# Patient Record
Sex: MALE | Race: OTHER | Employment: FULL TIME | URBAN - METROPOLITAN AREA
[De-identification: names, ages, dates, MRNs, and addresses within clinical notes are randomized per-mention and may not be internally consistent; named-entity substitution may affect disease eponyms.]

---

## 2018-06-10 ENCOUNTER — HOSPITAL ENCOUNTER (EMERGENCY)
Age: 38
Discharge: HOME | End: 2018-06-10
Attending: EMERGENCY MEDICINE

## 2018-06-10 VITALS
SYSTOLIC BLOOD PRESSURE: 141 MMHG | RESPIRATION RATE: 18 BRPM | DIASTOLIC BLOOD PRESSURE: 91 MMHG | OXYGEN SATURATION: 97 % | TEMPERATURE: 98.7 F | HEIGHT: 74 IN | BODY MASS INDEX: 35.29 KG/M2 | HEART RATE: 105 BPM | WEIGHT: 275 LBS

## 2018-06-10 DIAGNOSIS — S61.411A LACERATION OF RIGHT HAND WITHOUT FOREIGN BODY, INITIAL ENCOUNTER: Primary | ICD-10-CM

## 2018-06-10 PROCEDURE — 12002 RPR S/N/AX/GEN/TRNK2.6-7.5CM: CPT

## 2018-06-10 PROCEDURE — 2500000003 HC RX 250 WO HCPCS: Performed by: NURSE PRACTITIONER

## 2018-06-10 PROCEDURE — 6370000000 HC RX 637 (ALT 250 FOR IP): Performed by: EMERGENCY MEDICINE

## 2018-06-10 PROCEDURE — 99282 EMERGENCY DEPT VISIT SF MDM: CPT

## 2018-06-10 PROCEDURE — 2580000003 HC RX 258: Performed by: EMERGENCY MEDICINE

## 2018-06-10 RX ORDER — CEPHALEXIN 500 MG/1
500 CAPSULE ORAL 4 TIMES DAILY
Qty: 28 CAPSULE | Refills: 0 | Status: SHIPPED | OUTPATIENT
Start: 2018-06-10 | End: 2018-06-17

## 2018-06-10 RX ORDER — LIDOCAINE HYDROCHLORIDE 20 MG/ML
5 INJECTION, SOLUTION INFILTRATION; PERINEURAL ONCE
Status: COMPLETED | OUTPATIENT
Start: 2018-06-10 | End: 2018-06-10

## 2018-06-10 RX ORDER — MAGNESIUM HYDROXIDE 1200 MG/15ML
500 LIQUID ORAL ONCE
Status: COMPLETED | OUTPATIENT
Start: 2018-06-10 | End: 2018-06-10

## 2018-06-10 RX ORDER — DIAPER,BRIEF,INFANT-TODD,DISP
EACH MISCELLANEOUS ONCE
Status: COMPLETED | OUTPATIENT
Start: 2018-06-10 | End: 2018-06-10

## 2018-06-10 RX ORDER — CEPHALEXIN 500 MG/1
500 CAPSULE ORAL ONCE
Status: COMPLETED | OUTPATIENT
Start: 2018-06-10 | End: 2018-06-10

## 2018-06-10 RX ADMIN — LIDOCAINE HYDROCHLORIDE 5 ML: 20 INJECTION, SOLUTION INFILTRATION; PERINEURAL at 02:56

## 2018-06-10 RX ADMIN — BACITRACIN ZINC 1 G: 500 OINTMENT TOPICAL at 02:35

## 2018-06-10 RX ADMIN — WATER 500 ML: 100 IRRIGANT IRRIGATION at 02:28

## 2018-06-10 RX ADMIN — CEPHALEXIN 500 MG: 500 CAPSULE ORAL at 02:27

## 2018-06-10 ASSESSMENT — PAIN SCALES - GENERAL: PAINLEVEL_OUTOF10: 0

## 2019-11-14 ENCOUNTER — HOSPITAL ENCOUNTER (EMERGENCY)
Age: 39
Discharge: HOME | End: 2019-11-14
Attending: EMERGENCY MEDICINE
Payer: COMMERCIAL

## 2019-11-14 ENCOUNTER — APPOINTMENT (OUTPATIENT)
Dept: GENERAL RADIOLOGY | Age: 39
End: 2019-11-14
Payer: COMMERCIAL

## 2019-11-14 VITALS
DIASTOLIC BLOOD PRESSURE: 85 MMHG | RESPIRATION RATE: 16 BRPM | SYSTOLIC BLOOD PRESSURE: 132 MMHG | HEIGHT: 74 IN | OXYGEN SATURATION: 97 % | BODY MASS INDEX: 34.01 KG/M2 | WEIGHT: 265 LBS | TEMPERATURE: 99 F

## 2019-11-14 DIAGNOSIS — M72.2 PLANTAR FASCIITIS OF LEFT FOOT: Primary | ICD-10-CM

## 2019-11-14 PROCEDURE — 6370000000 HC RX 637 (ALT 250 FOR IP): Performed by: EMERGENCY MEDICINE

## 2019-11-14 PROCEDURE — 73630 X-RAY EXAM OF FOOT: CPT

## 2019-11-14 PROCEDURE — 99283 EMERGENCY DEPT VISIT LOW MDM: CPT

## 2019-11-14 RX ORDER — TRAMADOL HYDROCHLORIDE 50 MG/1
50 TABLET ORAL EVERY 6 HOURS PRN
Qty: 12 TABLET | Refills: 0 | Status: SHIPPED | OUTPATIENT
Start: 2019-11-14 | End: 2019-11-17

## 2019-11-14 RX ORDER — IBUPROFEN 800 MG/1
800 TABLET ORAL ONCE
Status: COMPLETED | OUTPATIENT
Start: 2019-11-14 | End: 2019-11-14

## 2019-11-14 RX ORDER — NAPROXEN 500 MG/1
500 TABLET ORAL 2 TIMES DAILY WITH MEALS
Qty: 20 TABLET | Refills: 0 | Status: ON HOLD | OUTPATIENT
Start: 2019-11-14 | End: 2020-07-30

## 2019-11-14 RX ADMIN — IBUPROFEN 800 MG: 800 TABLET, FILM COATED ORAL at 02:12

## 2019-11-14 ASSESSMENT — PAIN SCALES - GENERAL
PAINLEVEL_OUTOF10: 8
PAINLEVEL_OUTOF10: 8

## 2019-11-14 ASSESSMENT — ENCOUNTER SYMPTOMS
COUGH: 0
VOMITING: 0
SORE THROAT: 0
EYE DISCHARGE: 0
WHEEZING: 0
ABDOMINAL DISTENTION: 0
BACK PAIN: 0
PHOTOPHOBIA: 0
CHEST TIGHTNESS: 0
SHORTNESS OF BREATH: 0
ABDOMINAL PAIN: 0

## 2019-11-14 ASSESSMENT — PAIN DESCRIPTION - LOCATION: LOCATION: FOOT

## 2019-11-14 ASSESSMENT — PAIN DESCRIPTION - PAIN TYPE: TYPE: ACUTE PAIN

## 2019-11-14 ASSESSMENT — PAIN DESCRIPTION - ORIENTATION: ORIENTATION: LEFT

## 2020-07-30 ENCOUNTER — APPOINTMENT (OUTPATIENT)
Dept: CT IMAGING | Age: 40
DRG: 871 | End: 2020-07-30
Payer: COMMERCIAL

## 2020-07-30 ENCOUNTER — HOSPITAL ENCOUNTER (INPATIENT)
Age: 40
LOS: 5 days | Discharge: HOME | DRG: 871 | End: 2020-08-04
Attending: EMERGENCY MEDICINE | Admitting: INTERNAL MEDICINE
Payer: COMMERCIAL

## 2020-07-30 PROBLEM — K85.90 ACUTE PANCREATITIS: Status: ACTIVE | Noted: 2020-07-30

## 2020-07-30 LAB
ALBUMIN SERPL-MCNC: 3.5 G/DL (ref 3.5–4.6)
ALP BLD-CCNC: 104 U/L (ref 35–104)
ALT SERPL-CCNC: <5 U/L (ref 0–41)
ANION GAP SERPL CALCULATED.3IONS-SCNC: 8 MEQ/L (ref 9–15)
ANISOCYTOSIS: ABNORMAL
AST SERPL-CCNC: <5 U/L (ref 0–40)
BASOPHILS ABSOLUTE: 0 K/UL (ref 0–0.2)
BASOPHILS RELATIVE PERCENT: 0.2 %
BILIRUB SERPL-MCNC: 0.3 MG/DL (ref 0.2–0.7)
BUN BLDV-MCNC: 8 MG/DL (ref 6–20)
CALCIUM SERPL-MCNC: 8.7 MG/DL (ref 8.5–9.9)
CHLORIDE BLD-SCNC: 87 MEQ/L (ref 95–107)
CHP ED QC CHECK: YES
CO2: 28 MEQ/L (ref 20–31)
CREAT SERPL-MCNC: 0.46 MG/DL (ref 0.7–1.2)
EKG ATRIAL RATE: 103 BPM
EKG P AXIS: 50 DEGREES
EKG P-R INTERVAL: 132 MS
EKG Q-T INTERVAL: 326 MS
EKG QRS DURATION: 96 MS
EKG QTC CALCULATION (BAZETT): 427 MS
EKG R AXIS: 22 DEGREES
EKG T AXIS: 17 DEGREES
EKG VENTRICULAR RATE: 103 BPM
EOSINOPHILS ABSOLUTE: 0.2 K/UL (ref 0–0.7)
EOSINOPHILS RELATIVE PERCENT: 1.3 %
GFR AFRICAN AMERICAN: >60
GFR NON-AFRICAN AMERICAN: >60
GLOBULIN: 1.9 G/DL (ref 2.3–3.5)
GLUCOSE BLD-MCNC: 283 MG/DL (ref 70–99)
GLUCOSE BLD-MCNC: 301 MG/DL (ref 60–115)
GLUCOSE BLD-MCNC: 308 MG/DL (ref 60–115)
GLUCOSE BLD-MCNC: 353 MG/DL
GLUCOSE BLD-MCNC: 353 MG/DL (ref 60–115)
HCT VFR BLD CALC: 36.9 % (ref 42–52)
HEMOGLOBIN: 12.5 G/DL (ref 14–18)
LIPASE: 123 U/L (ref 12–95)
LYMPHOCYTES ABSOLUTE: 1.1 K/UL (ref 1–4.8)
LYMPHOCYTES RELATIVE PERCENT: 7.6 %
MCH RBC QN AUTO: 30 PG (ref 27–31.3)
MCHC RBC AUTO-ENTMCNC: 34.1 % (ref 33–37)
MCV RBC AUTO: 85.3 FL (ref 80–100)
MONOCYTES ABSOLUTE: 1.5 K/UL (ref 0.2–0.8)
MONOCYTES RELATIVE PERCENT: 10.5 %
NEUTROPHILS ABSOLUTE: 11.8 K/UL (ref 1.4–6.5)
NEUTROPHILS RELATIVE PERCENT: 80.4 %
PDW BLD-RTO: 14.5 % (ref 11.5–14.5)
PERFORMED ON: ABNORMAL
PLATELET # BLD: 423 K/UL (ref 130–400)
PLATELET SLIDE REVIEW: ADEQUATE
POC CREATININE WHOLE BLOOD: 0.8
POTASSIUM SERPL-SCNC: 3 MEQ/L (ref 3.4–4.9)
RBC # BLD: 4.33 M/UL (ref 4.7–6.1)
SLIDE REVIEW: ABNORMAL
SODIUM BLD-SCNC: 123 MEQ/L (ref 135–144)
TOTAL PROTEIN: 5.4 G/DL (ref 6.3–8)
WBC # BLD: 14.6 K/UL (ref 4.8–10.8)

## 2020-07-30 PROCEDURE — 83930 ASSAY OF BLOOD OSMOLALITY: CPT

## 2020-07-30 PROCEDURE — 96374 THER/PROPH/DIAG INJ IV PUSH: CPT

## 2020-07-30 PROCEDURE — 96361 HYDRATE IV INFUSION ADD-ON: CPT

## 2020-07-30 PROCEDURE — 74177 CT ABD & PELVIS W/CONTRAST: CPT

## 2020-07-30 PROCEDURE — 83690 ASSAY OF LIPASE: CPT

## 2020-07-30 PROCEDURE — 6360000002 HC RX W HCPCS: Performed by: EMERGENCY MEDICINE

## 2020-07-30 PROCEDURE — 93005 ELECTROCARDIOGRAM TRACING: CPT | Performed by: EMERGENCY MEDICINE

## 2020-07-30 PROCEDURE — 87040 BLOOD CULTURE FOR BACTERIA: CPT

## 2020-07-30 PROCEDURE — 36415 COLL VENOUS BLD VENIPUNCTURE: CPT

## 2020-07-30 PROCEDURE — 85025 COMPLETE CBC W/AUTO DIFF WBC: CPT

## 2020-07-30 PROCEDURE — 83735 ASSAY OF MAGNESIUM: CPT

## 2020-07-30 PROCEDURE — 80053 COMPREHEN METABOLIC PANEL: CPT

## 2020-07-30 PROCEDURE — 99285 EMERGENCY DEPT VISIT HI MDM: CPT

## 2020-07-30 PROCEDURE — 96372 THER/PROPH/DIAG INJ SC/IM: CPT

## 2020-07-30 PROCEDURE — 2580000003 HC RX 258: Performed by: INTERNAL MEDICINE

## 2020-07-30 PROCEDURE — 2580000003 HC RX 258: Performed by: EMERGENCY MEDICINE

## 2020-07-30 PROCEDURE — 6360000004 HC RX CONTRAST MEDICATION: Performed by: EMERGENCY MEDICINE

## 2020-07-30 PROCEDURE — 6360000002 HC RX W HCPCS: Performed by: INTERNAL MEDICINE

## 2020-07-30 PROCEDURE — 83036 HEMOGLOBIN GLYCOSYLATED A1C: CPT

## 2020-07-30 PROCEDURE — 96375 TX/PRO/DX INJ NEW DRUG ADDON: CPT

## 2020-07-30 PROCEDURE — 1210000000 HC MED SURG R&B

## 2020-07-30 RX ORDER — DEXTROSE MONOHYDRATE 50 MG/ML
100 INJECTION, SOLUTION INTRAVENOUS PRN
Status: DISCONTINUED | OUTPATIENT
Start: 2020-07-30 | End: 2020-08-04 | Stop reason: HOSPADM

## 2020-07-30 RX ORDER — POLYETHYLENE GLYCOL 3350 17 G/17G
17 POWDER, FOR SOLUTION ORAL DAILY PRN
Status: DISCONTINUED | OUTPATIENT
Start: 2020-07-30 | End: 2020-08-04 | Stop reason: HOSPADM

## 2020-07-30 RX ORDER — NICOTINE POLACRILEX 4 MG
15 LOZENGE BUCCAL PRN
Status: DISCONTINUED | OUTPATIENT
Start: 2020-07-30 | End: 2020-08-04 | Stop reason: HOSPADM

## 2020-07-30 RX ORDER — 0.9 % SODIUM CHLORIDE 0.9 %
1000 INTRAVENOUS SOLUTION INTRAVENOUS ONCE
Status: COMPLETED | OUTPATIENT
Start: 2020-07-30 | End: 2020-07-30

## 2020-07-30 RX ORDER — ONDANSETRON 2 MG/ML
4 INJECTION INTRAMUSCULAR; INTRAVENOUS EVERY 6 HOURS PRN
Status: DISCONTINUED | OUTPATIENT
Start: 2020-07-30 | End: 2020-08-04 | Stop reason: HOSPADM

## 2020-07-30 RX ORDER — PROMETHAZINE HYDROCHLORIDE 12.5 MG/1
12.5 TABLET ORAL EVERY 6 HOURS PRN
Status: DISCONTINUED | OUTPATIENT
Start: 2020-07-30 | End: 2020-08-04 | Stop reason: HOSPADM

## 2020-07-30 RX ORDER — KETOROLAC TROMETHAMINE 30 MG/ML
30 INJECTION, SOLUTION INTRAMUSCULAR; INTRAVENOUS ONCE
Status: COMPLETED | OUTPATIENT
Start: 2020-07-30 | End: 2020-07-30

## 2020-07-30 RX ORDER — ONDANSETRON 2 MG/ML
4 INJECTION INTRAMUSCULAR; INTRAVENOUS ONCE
Status: COMPLETED | OUTPATIENT
Start: 2020-07-30 | End: 2020-07-30

## 2020-07-30 RX ORDER — POTASSIUM CHLORIDE 7.45 MG/ML
10 INJECTION INTRAVENOUS
Status: COMPLETED | OUTPATIENT
Start: 2020-07-31 | End: 2020-07-31

## 2020-07-30 RX ORDER — ACETAMINOPHEN 650 MG/1
650 SUPPOSITORY RECTAL EVERY 6 HOURS PRN
Status: DISCONTINUED | OUTPATIENT
Start: 2020-07-30 | End: 2020-08-01

## 2020-07-30 RX ORDER — SODIUM CHLORIDE 0.9 % (FLUSH) 0.9 %
10 SYRINGE (ML) INJECTION EVERY 12 HOURS SCHEDULED
Status: DISCONTINUED | OUTPATIENT
Start: 2020-07-30 | End: 2020-08-04 | Stop reason: HOSPADM

## 2020-07-30 RX ORDER — SODIUM CHLORIDE 0.9 % (FLUSH) 0.9 %
10 SYRINGE (ML) INJECTION PRN
Status: DISCONTINUED | OUTPATIENT
Start: 2020-07-30 | End: 2020-08-04 | Stop reason: HOSPADM

## 2020-07-30 RX ORDER — KETOROLAC TROMETHAMINE 15 MG/ML
15 INJECTION, SOLUTION INTRAMUSCULAR; INTRAVENOUS EVERY 6 HOURS PRN
Status: DISCONTINUED | OUTPATIENT
Start: 2020-07-30 | End: 2020-08-04 | Stop reason: HOSPADM

## 2020-07-30 RX ORDER — SODIUM CHLORIDE 9 MG/ML
INJECTION, SOLUTION INTRAVENOUS CONTINUOUS
Status: DISCONTINUED | OUTPATIENT
Start: 2020-07-30 | End: 2020-07-31

## 2020-07-30 RX ORDER — ACETAMINOPHEN 325 MG/1
650 TABLET ORAL EVERY 6 HOURS PRN
Status: DISCONTINUED | OUTPATIENT
Start: 2020-07-30 | End: 2020-08-01

## 2020-07-30 RX ORDER — DEXTROSE MONOHYDRATE 25 G/50ML
12.5 INJECTION, SOLUTION INTRAVENOUS PRN
Status: DISCONTINUED | OUTPATIENT
Start: 2020-07-30 | End: 2020-08-04 | Stop reason: HOSPADM

## 2020-07-30 RX ORDER — ICOSAPENT ETHYL 1000 MG/1
2 CAPSULE ORAL DAILY
Status: ON HOLD | COMMUNITY
End: 2020-08-04 | Stop reason: HOSPADM

## 2020-07-30 RX ADMIN — ONDANSETRON 4 MG: 2 INJECTION INTRAMUSCULAR; INTRAVENOUS at 19:56

## 2020-07-30 RX ADMIN — POTASSIUM CHLORIDE 10 MEQ: 7.46 INJECTION, SOLUTION INTRAVENOUS at 23:51

## 2020-07-30 RX ADMIN — KETOROLAC TROMETHAMINE 30 MG: 30 INJECTION, SOLUTION INTRAMUSCULAR at 19:56

## 2020-07-30 RX ADMIN — SODIUM CHLORIDE: 9 INJECTION, SOLUTION INTRAVENOUS at 23:51

## 2020-07-30 RX ADMIN — IOPAMIDOL 100 ML: 612 INJECTION, SOLUTION INTRAVENOUS at 20:26

## 2020-07-30 RX ADMIN — HYDROMORPHONE HYDROCHLORIDE 1 MG: 1 INJECTION, SOLUTION INTRAMUSCULAR; INTRAVENOUS; SUBCUTANEOUS at 19:56

## 2020-07-30 RX ADMIN — SODIUM CHLORIDE 1000 ML: 9 INJECTION, SOLUTION INTRAVENOUS at 19:56

## 2020-07-30 ASSESSMENT — PAIN DESCRIPTION - LOCATION
LOCATION: ABDOMEN
LOCATION: ABDOMEN;CHEST

## 2020-07-30 ASSESSMENT — PAIN SCALES - GENERAL
PAINLEVEL_OUTOF10: 7
PAINLEVEL_OUTOF10: 0
PAINLEVEL_OUTOF10: 7

## 2020-07-30 ASSESSMENT — PAIN DESCRIPTION - ORIENTATION: ORIENTATION: UPPER

## 2020-07-30 ASSESSMENT — PAIN DESCRIPTION - PAIN TYPE: TYPE: ACUTE PAIN

## 2020-07-30 ASSESSMENT — ENCOUNTER SYMPTOMS
SHORTNESS OF BREATH: 0
SORE THROAT: 0
COUGH: 0
WHEEZING: 0
PHOTOPHOBIA: 0
CHEST TIGHTNESS: 0
EYE DISCHARGE: 0
NAUSEA: 1
ABDOMINAL DISTENTION: 0
ABDOMINAL PAIN: 1
VOMITING: 1

## 2020-07-30 ASSESSMENT — PAIN DESCRIPTION - FREQUENCY: FREQUENCY: CONTINUOUS

## 2020-07-30 ASSESSMENT — PAIN DESCRIPTION - DESCRIPTORS: DESCRIPTORS: PRESSURE;BURNING

## 2020-07-30 NOTE — ED TRIAGE NOTES
Pt states that he was having ABD xin this morning. Pt states that he vomited and then began having lower CP after. Pt states the pain is burning and pressure. Pt states that he is also having constant thirst and increased urination with a foul odor. Pt is on metformin but does not check BGL levels.

## 2020-07-30 NOTE — ED PROVIDER NOTES
Smoking status: Current Every Day Smoker     Packs/day: 0.50     Last attempt to quit: 2014     Years since quittin.9    Smokeless tobacco: Never Used   Substance and Sexual Activity    Alcohol use: Yes     Comment: weekends    Drug use: No    Sexual activity: Not on file   Lifestyle    Physical activity     Days per week: Not on file     Minutes per session: Not on file    Stress: Not on file   Relationships    Social connections     Talks on phone: Not on file     Gets together: Not on file     Attends Church service: Not on file     Active member of club or organization: Not on file     Attends meetings of clubs or organizations: Not on file     Relationship status: Not on file    Intimate partner violence     Fear of current or ex partner: Not on file     Emotionally abused: Not on file     Physically abused: Not on file     Forced sexual activity: Not on file   Other Topics Concern    Not on file   Social History Narrative    Not on file       SCREENINGS      @[facility]([id number])@      PHYSICAL EXAM    (up to 7 for level 4, 8 or more for level 5)     ED Triage Vitals [20 1925]   BP Temp Temp Source Pulse Resp SpO2 Height Weight   (!) 141/82 100.8 °F (38.2 °C) Oral 104 20 95 % 6' 2\" (1.88 m) 280 lb (127 kg)       Physical Exam  Vitals signs and nursing note reviewed. Constitutional:       Appearance: He is well-developed. HENT:      Head: Normocephalic. Nose: Nose normal.   Eyes:      Conjunctiva/sclera: Conjunctivae normal.      Pupils: Pupils are equal, round, and reactive to light. Neck:      Musculoskeletal: Normal range of motion and neck supple. Cardiovascular:      Rate and Rhythm: Normal rate and regular rhythm. Heart sounds: Normal heart sounds. Pulmonary:      Effort: Pulmonary effort is normal.      Breath sounds: Normal breath sounds. Abdominal:      General: Bowel sounds are normal.      Palpations: Abdomen is soft. Tenderness:  There is abdominal tenderness in the epigastric area. There is no guarding. Musculoskeletal: Normal range of motion. Skin:     General: Skin is warm and dry. Capillary Refill: Capillary refill takes less than 2 seconds. Neurological:      General: No focal deficit present. Mental Status: He is alert and oriented to person, place, and time. Psychiatric:         Mood and Affect: Mood normal.         DIAGNOSTIC RESULTS     EKG: All EKG's are interpreted by the Emergency Department Physician who either signs or Co-signsthis chart in the absence of a cardiologist.    EKG shows sinus tachycardia rate of 103. No acute ST or T wave changes. Normal axis. Normal EKG.     RADIOLOGY:   Non-plain filmimages such as CT, Ultrasound and MRI are read by the radiologist. Plain radiographic images are visualized and preliminarily interpreted by the emergency physician with the below findings:      Interpretation per the Radiologist below, if available at the time ofthis note:    CT ABDOMEN PELVIS W IV CONTRAST Additional Contrast? None    (Results Pending)         ED BEDSIDE ULTRASOUND:   Performed by ED Physician - none    LABS:  Labs Reviewed   COMPREHENSIVE METABOLIC PANEL - Abnormal; Notable for the following components:       Result Value    Sodium 123 (*)     Potassium 3.0 (*)     Chloride 87 (*)     Anion Gap 8 (*)     Glucose 283 (*)     CREATININE 0.46 (*)     Total Protein 5.4 (*)     Globulin 1.9 (*)     All other components within normal limits    Narrative:     CALL  [facility]  [facility] tel. [id number],  POTASSIUM results called to and read back by Naseem Momin RN, 07/30/2020  21:03, by Radha Dubon   CBC WITH AUTO DIFFERENTIAL - Abnormal; Notable for the following components:    WBC 14.6 (*)     RBC 4.33 (*)     Hemoglobin 12.5 (*)     Hematocrit 36.9 (*)     Platelets 408 (*)     Neutrophils Absolute 11.8 (*)     Monocytes Absolute 1.5 (*)     All other components within normal limits   LIPASE - Abnormal; Notable for the following components:    Lipase 123 (*)     All other components within normal limits    Narrative:     Felix Bee  [facility] tel. [phone],  POTASSIUM results called to and read back by Eduin Recinos RN, 07/30/2020  21:03, by [facility]   POCT GLUCOSE - Abnormal; Notable for the following components:    POC Glucose 353 (*)     All other components within normal limits   POCT GLUCOSE - Normal   POCT CREATININE - URINE - Normal       All other labs were within normal range or not returned as of this dictation. EMERGENCY DEPARTMENT COURSE and DIFFERENTIAL DIAGNOSIS/MDM:   Vitals:    Vitals:    07/30/20 1925 07/30/20 2005 07/30/20 2050   BP: (!) 141/82 (!) 141/86 134/82   Pulse: 104 95 92   Resp: 20 20 20   Temp: 100.8 °F (38.2 °C)     TempSrc: Oral     SpO2: 95% 96% 98%   Weight: 280 lb (127 kg)     Height: 6' 2\" (1.88 m)          MDM patient was found to have a elevated temperature of 100.8. In addition white count was 14,000. CT abdomen pelvis shows peripancreatic fat with fluid concerning for acute pancreatitis. Lipase is elevated at 115. Sodium was low at 123. Potassium was low at 3. Based on his electrolyte disturbances his acute pancreatitis with fever patient is treated with bowel rest IV fluids and further observation. CONSULTS:  None    PROCEDURES:  Unless otherwise noted below, none     Procedures    FINAL IMPRESSION      1. Idiopathic acute pancreatitis, unspecified complication status    2. Hyponatremia    3. Hypokalemia          DISPOSITION/PLAN   DISPOSITION Decision To Admit 07/30/2020 09:25:25 PM      PATIENT REFERRED TO:  No follow-up provider specified.     DISCHARGE MEDICATIONS:  New Prescriptions    No medications on file          (Please note that portions of this note were completed with a voice recognition program.  Efforts were made to edit the dictations but occasionally words are mis-transcribed.)    Josh Villasenor MD (electronically signed)  Attending Emergency Physician Amanda Suazo MD  07/30/20 [id number]       Amanda Suazo MD  08/27/20 1635

## 2020-07-31 ENCOUNTER — APPOINTMENT (OUTPATIENT)
Dept: ULTRASOUND IMAGING | Age: 40
DRG: 871 | End: 2020-07-31
Payer: COMMERCIAL

## 2020-07-31 LAB
ALBUMIN SERPL-MCNC: ABNORMAL G/DL (ref 3.5–4.6)
ALP BLD-CCNC: 88 U/L (ref 35–104)
ALT SERPL-CCNC: ABNORMAL U/L (ref 0–41)
ANION GAP SERPL CALCULATED.3IONS-SCNC: 1 MEQ/L (ref 9–15)
AST SERPL-CCNC: ABNORMAL U/L (ref 0–40)
BASOPHILS ABSOLUTE: 0.1 K/UL (ref 0–0.2)
BASOPHILS RELATIVE PERCENT: 0.7 %
BILIRUB SERPL-MCNC: ABNORMAL MG/DL (ref 0.2–0.7)
BUN BLDV-MCNC: 6 MG/DL (ref 6–20)
CALCIUM SERPL-MCNC: 8 MG/DL (ref 8.5–9.9)
CHLORIDE BLD-SCNC: 90 MEQ/L (ref 95–107)
CHOLESTEROL, TOTAL: 613 MG/DL (ref 0–199)
CO2: 32 MEQ/L (ref 20–31)
CREAT SERPL-MCNC: ABNORMAL MG/DL (ref 0.7–1.2)
CREATININE URINE: 285.2 MG/DL
EOSINOPHILS ABSOLUTE: 0.2 K/UL (ref 0–0.7)
EOSINOPHILS RELATIVE PERCENT: 1.3 %
GFR AFRICAN AMERICAN: ABNORMAL
GFR NON-AFRICAN AMERICAN: ABNORMAL
GLOBULIN: ABNORMAL G/DL (ref 2.3–3.5)
GLUCOSE BLD-MCNC: 147 MG/DL (ref 60–115)
GLUCOSE BLD-MCNC: 172 MG/DL (ref 60–115)
GLUCOSE BLD-MCNC: 181 MG/DL (ref 60–115)
GLUCOSE BLD-MCNC: 193 MG/DL (ref 60–115)
GLUCOSE BLD-MCNC: 194 MG/DL (ref 60–115)
GLUCOSE BLD-MCNC: 203 MG/DL (ref 60–115)
GLUCOSE BLD-MCNC: 208 MG/DL (ref 60–115)
GLUCOSE BLD-MCNC: 248 MG/DL (ref 60–115)
GLUCOSE BLD-MCNC: 255 MG/DL (ref 60–115)
GLUCOSE BLD-MCNC: 272 MG/DL (ref 60–115)
GLUCOSE BLD-MCNC: 298 MG/DL (ref 60–115)
GLUCOSE BLD-MCNC: 325 MG/DL (ref 60–115)
GLUCOSE BLD-MCNC: 353 MG/DL (ref 60–115)
GLUCOSE BLD-MCNC: ABNORMAL MG/DL (ref 70–99)
HBA1C MFR BLD: 11.8 % (ref 4.8–5.9)
HBA1C MFR BLD: 12.9 % (ref 4.8–5.9)
HCT VFR BLD CALC: 34.8 % (ref 42–52)
HDLC SERPL-MCNC: 14 MG/DL (ref 40–59)
HEMOGLOBIN: 12.2 G/DL (ref 14–18)
LDL CHOLESTEROL CALCULATED: ABNORMAL MG/DL (ref 0–129)
LYMPHOCYTES ABSOLUTE: 1.2 K/UL (ref 1–4.8)
LYMPHOCYTES RELATIVE PERCENT: 9 %
MAGNESIUM: 1.6 MG/DL (ref 1.7–2.4)
MAGNESIUM: 1.8 MG/DL (ref 1.7–2.4)
MCH RBC QN AUTO: 30 PG (ref 27–31.3)
MCHC RBC AUTO-ENTMCNC: 34 % (ref 33–37)
MCV RBC AUTO: 87.2 FL (ref 80–100)
MONOCYTES ABSOLUTE: 0.7 K/UL (ref 0.2–0.8)
MONOCYTES RELATIVE PERCENT: 5.2 %
NEUTROPHILS ABSOLUTE: 11.4 K/UL (ref 1.4–6.5)
NEUTROPHILS RELATIVE PERCENT: 83.8 %
OSMOLALITY URINE: 1154 MOSM/KG (ref 300–900)
OSMOLALITY: 289 MOSM/KG (ref 280–303)
PDW BLD-RTO: 14.9 % (ref 11.5–14.5)
PERFORMED ON: ABNORMAL
PLATELET # BLD: 338 K/UL (ref 130–400)
POTASSIUM SERPL-SCNC: 3.7 MEQ/L (ref 3.4–4.9)
RBC # BLD: 3.99 M/UL (ref 4.7–6.1)
SLIDE REVIEW: ABNORMAL
SODIUM BLD-SCNC: 123 MEQ/L (ref 135–144)
SODIUM URINE: 78 MEQ/L
TOTAL PROTEIN: 1.5 G/DL (ref 6.3–8)
TRIGL SERPL-MCNC: >4425 MG/DL (ref 0–150)
UREA NITROGEN, UR: 833 MG/DL
WBC # BLD: 13.6 K/UL (ref 4.8–10.8)

## 2020-07-31 PROCEDURE — 82570 ASSAY OF URINE CREATININE: CPT

## 2020-07-31 PROCEDURE — 99253 IP/OBS CNSLTJ NEW/EST LOW 45: CPT | Performed by: INTERNAL MEDICINE

## 2020-07-31 PROCEDURE — 87040 BLOOD CULTURE FOR BACTERIA: CPT

## 2020-07-31 PROCEDURE — 83036 HEMOGLOBIN GLYCOSYLATED A1C: CPT

## 2020-07-31 PROCEDURE — 80053 COMPREHEN METABOLIC PANEL: CPT

## 2020-07-31 PROCEDURE — 6370000000 HC RX 637 (ALT 250 FOR IP): Performed by: INTERNAL MEDICINE

## 2020-07-31 PROCEDURE — APPNB60 APP NON BILLABLE TIME 46-60 MINS: Performed by: NURSE PRACTITIONER

## 2020-07-31 PROCEDURE — 76705 ECHO EXAM OF ABDOMEN: CPT

## 2020-07-31 PROCEDURE — 85025 COMPLETE CBC W/AUTO DIFF WBC: CPT

## 2020-07-31 PROCEDURE — 83935 ASSAY OF URINE OSMOLALITY: CPT

## 2020-07-31 PROCEDURE — 80061 LIPID PANEL: CPT

## 2020-07-31 PROCEDURE — 36415 COLL VENOUS BLD VENIPUNCTURE: CPT

## 2020-07-31 PROCEDURE — 84540 ASSAY OF URINE/UREA-N: CPT

## 2020-07-31 PROCEDURE — 2580000003 HC RX 258: Performed by: INTERNAL MEDICINE

## 2020-07-31 PROCEDURE — 6360000002 HC RX W HCPCS: Performed by: INTERNAL MEDICINE

## 2020-07-31 PROCEDURE — 93010 ELECTROCARDIOGRAM REPORT: CPT | Performed by: INTERNAL MEDICINE

## 2020-07-31 PROCEDURE — 99222 1ST HOSP IP/OBS MODERATE 55: CPT | Performed by: INTERNAL MEDICINE

## 2020-07-31 PROCEDURE — 84300 ASSAY OF URINE SODIUM: CPT

## 2020-07-31 PROCEDURE — 83735 ASSAY OF MAGNESIUM: CPT

## 2020-07-31 PROCEDURE — 1210000000 HC MED SURG R&B

## 2020-07-31 RX ORDER — DEXTROSE AND SODIUM CHLORIDE 5; .45 G/100ML; G/100ML
INJECTION, SOLUTION INTRAVENOUS CONTINUOUS
Status: DISCONTINUED | OUTPATIENT
Start: 2020-07-31 | End: 2020-08-03

## 2020-07-31 RX ORDER — LORAZEPAM 2 MG/ML
0.5 INJECTION INTRAMUSCULAR EVERY 6 HOURS PRN
Status: DISCONTINUED | OUTPATIENT
Start: 2020-07-31 | End: 2020-08-04 | Stop reason: HOSPADM

## 2020-07-31 RX ORDER — NICOTINE POLACRILEX 4 MG
15 LOZENGE BUCCAL PRN
Status: DISCONTINUED | OUTPATIENT
Start: 2020-07-31 | End: 2020-08-04 | Stop reason: HOSPADM

## 2020-07-31 RX ORDER — FENOFIBRATE 160 MG/1
160 TABLET ORAL DAILY
Status: DISCONTINUED | OUTPATIENT
Start: 2020-07-31 | End: 2020-08-04 | Stop reason: HOSPADM

## 2020-07-31 RX ORDER — DEXTROSE MONOHYDRATE 50 MG/ML
100 INJECTION, SOLUTION INTRAVENOUS PRN
Status: DISCONTINUED | OUTPATIENT
Start: 2020-07-31 | End: 2020-08-04 | Stop reason: HOSPADM

## 2020-07-31 RX ORDER — DEXTROSE MONOHYDRATE 25 G/50ML
12.5 INJECTION, SOLUTION INTRAVENOUS PRN
Status: DISCONTINUED | OUTPATIENT
Start: 2020-07-31 | End: 2020-08-04 | Stop reason: HOSPADM

## 2020-07-31 RX ORDER — ATORVASTATIN CALCIUM 80 MG/1
80 TABLET, FILM COATED ORAL NIGHTLY
Status: DISCONTINUED | OUTPATIENT
Start: 2020-07-31 | End: 2020-08-04 | Stop reason: HOSPADM

## 2020-07-31 RX ORDER — MULTIVITAMIN WITH IRON
1 TABLET ORAL DAILY
Status: DISCONTINUED | OUTPATIENT
Start: 2020-07-31 | End: 2020-08-04 | Stop reason: HOSPADM

## 2020-07-31 RX ORDER — OMEGA-3-ACID ETHYL ESTERS 1 G/1
2 CAPSULE, LIQUID FILLED ORAL 2 TIMES DAILY
Status: DISCONTINUED | OUTPATIENT
Start: 2020-07-31 | End: 2020-08-04 | Stop reason: HOSPADM

## 2020-07-31 RX ORDER — THIAMINE MONONITRATE (VIT B1) 100 MG
100 TABLET ORAL DAILY
Status: DISCONTINUED | OUTPATIENT
Start: 2020-07-31 | End: 2020-08-04 | Stop reason: HOSPADM

## 2020-07-31 RX ORDER — FOLIC ACID 1 MG/1
1 TABLET ORAL DAILY
Status: DISCONTINUED | OUTPATIENT
Start: 2020-07-31 | End: 2020-08-04 | Stop reason: HOSPADM

## 2020-07-31 RX ORDER — MORPHINE SULFATE 2 MG/ML
2 INJECTION, SOLUTION INTRAMUSCULAR; INTRAVENOUS
Status: DISCONTINUED | OUTPATIENT
Start: 2020-07-31 | End: 2020-08-04 | Stop reason: HOSPADM

## 2020-07-31 RX ADMIN — POTASSIUM CHLORIDE 10 MEQ: 7.46 INJECTION, SOLUTION INTRAVENOUS at 02:27

## 2020-07-31 RX ADMIN — Medication 2 MG: at 04:57

## 2020-07-31 RX ADMIN — Medication 2 MG: at 22:02

## 2020-07-31 RX ADMIN — HYDROMORPHONE HYDROCHLORIDE 0.5 MG: 1 INJECTION, SOLUTION INTRAMUSCULAR; INTRAVENOUS; SUBCUTANEOUS at 09:53

## 2020-07-31 RX ADMIN — Medication 2 MG: at 17:30

## 2020-07-31 RX ADMIN — POTASSIUM CHLORIDE 10 MEQ: 7.46 INJECTION, SOLUTION INTRAVENOUS at 00:57

## 2020-07-31 RX ADMIN — SODIUM CHLORIDE 8.79 UNITS/HR: 9 INJECTION, SOLUTION INTRAVENOUS at 10:47

## 2020-07-31 RX ADMIN — DEXTROSE AND SODIUM CHLORIDE: 5; 450 INJECTION, SOLUTION INTRAVENOUS at 09:55

## 2020-07-31 RX ADMIN — OMEGA-3-ACID ETHYL ESTERS 2 G: 1 CAPSULE, LIQUID FILLED ORAL at 20:17

## 2020-07-31 RX ADMIN — ENOXAPARIN SODIUM 40 MG: 40 INJECTION SUBCUTANEOUS at 10:02

## 2020-07-31 RX ADMIN — POTASSIUM CHLORIDE 10 MEQ: 7.46 INJECTION, SOLUTION INTRAVENOUS at 06:05

## 2020-07-31 RX ADMIN — SODIUM CHLORIDE 13.4 UNITS/HR: 9 INJECTION, SOLUTION INTRAVENOUS at 18:26

## 2020-07-31 RX ADMIN — POTASSIUM CHLORIDE 10 MEQ: 7.46 INJECTION, SOLUTION INTRAVENOUS at 03:59

## 2020-07-31 RX ADMIN — Medication 2 MG: at 11:48

## 2020-07-31 RX ADMIN — ACETAMINOPHEN 650 MG: 325 TABLET ORAL at 16:17

## 2020-07-31 RX ADMIN — KETOROLAC TROMETHAMINE 15 MG: 15 INJECTION, SOLUTION INTRAMUSCULAR; INTRAVENOUS at 07:34

## 2020-07-31 RX ADMIN — ATORVASTATIN CALCIUM 80 MG: 80 TABLET, FILM COATED ORAL at 20:17

## 2020-07-31 RX ADMIN — LORAZEPAM 0.5 MG: 2 INJECTION INTRAMUSCULAR; INTRAVENOUS at 16:19

## 2020-07-31 RX ADMIN — HYDROMORPHONE HYDROCHLORIDE 0.5 MG: 1 INJECTION, SOLUTION INTRAMUSCULAR; INTRAVENOUS; SUBCUTANEOUS at 14:26

## 2020-07-31 RX ADMIN — HYDROMORPHONE HYDROCHLORIDE 0.5 MG: 1 INJECTION, SOLUTION INTRAMUSCULAR; INTRAVENOUS; SUBCUTANEOUS at 20:17

## 2020-07-31 RX ADMIN — KETOROLAC TROMETHAMINE 15 MG: 15 INJECTION, SOLUTION INTRAMUSCULAR; INTRAVENOUS at 16:19

## 2020-07-31 RX ADMIN — DEXTROSE AND SODIUM CHLORIDE: 5; 450 INJECTION, SOLUTION INTRAVENOUS at 20:17

## 2020-07-31 RX ADMIN — KETOROLAC TROMETHAMINE 15 MG: 15 INJECTION, SOLUTION INTRAMUSCULAR; INTRAVENOUS at 01:01

## 2020-07-31 ASSESSMENT — PAIN SCALES - GENERAL
PAINLEVEL_OUTOF10: 7
PAINLEVEL_OUTOF10: 7
PAINLEVEL_OUTOF10: 5
PAINLEVEL_OUTOF10: 6
PAINLEVEL_OUTOF10: 7
PAINLEVEL_OUTOF10: 7
PAINLEVEL_OUTOF10: 9
PAINLEVEL_OUTOF10: 6
PAINLEVEL_OUTOF10: 3
PAINLEVEL_OUTOF10: 7
PAINLEVEL_OUTOF10: 10
PAINLEVEL_OUTOF10: 7

## 2020-07-31 NOTE — PROGRESS NOTES
Department of Internal Medicine  General Internal Medicine  Attending Progress Note      SUBJECTIVE:  Pt seen and examined. In severe pain and thirsty. States no nausea or vomiting and wants a sip of water    OBJECTIVE      Medications    Current Facility-Administered Medications: morphine (PF) injection 2 mg, 2 mg, Intravenous, Q3H PRN  HYDROmorphone (DILAUDID) injection 0.5 mg, 0.5 mg, Intravenous, Q4H PRN  dextrose 5 % and 0.45 % sodium chloride infusion, , Intravenous, Continuous  insulin regular (HUMULIN R;NOVOLIN R) 100 Units in sodium chloride 0.9 % 100 mL infusion, 0.1 Units/hr, Intravenous, Continuous  glucose (GLUTOSE) 40 % oral gel 15 g, 15 g, Oral, PRN  dextrose 50 % IV solution, 12.5 g, Intravenous, PRN  glucagon (rDNA) injection 1 mg, 1 mg, Intramuscular, PRN  dextrose 5 % solution, 100 mL/hr, Intravenous, PRN  sodium chloride flush 0.9 % injection 10 mL, 10 mL, Intravenous, 2 times per day  sodium chloride flush 0.9 % injection 10 mL, 10 mL, Intravenous, PRN  acetaminophen (TYLENOL) tablet 650 mg, 650 mg, Oral, Q6H PRN **OR** acetaminophen (TYLENOL) suppository 650 mg, 650 mg, Rectal, Q6H PRN  polyethylene glycol (GLYCOLAX) packet 17 g, 17 g, Oral, Daily PRN  promethazine (PHENERGAN) tablet 12.5 mg, 12.5 mg, Oral, Q6H PRN **OR** ondansetron (ZOFRAN) injection 4 mg, 4 mg, Intravenous, Q6H PRN  enoxaparin (LOVENOX) injection 40 mg, 40 mg, Subcutaneous, Daily  glucose (GLUTOSE) 40 % oral gel 15 g, 15 g, Oral, PRN  dextrose 50 % IV solution, 12.5 g, Intravenous, PRN  glucagon (rDNA) injection 1 mg, 1 mg, Intramuscular, PRN  dextrose 5 % solution, 100 mL/hr, Intravenous, PRN  ketorolac (TORADOL) injection 15 mg, 15 mg, Intravenous, Q6H PRN  Physical    VITALS:  BP (!) 161/94   Pulse 89   Temp 99 °F (37.2 °C) (Oral)   Resp 18   Ht 6' 2\" (1.88 m)   Wt 252 lb 3.3 oz (114.4 kg)   SpO2 98%   BMI 32.38 kg/m²   Constitutional: Awake and alert in distress and pain.  Lying in bed uncomfortably  Head: Normocephalic, atraumatic  Eyes: EOMI, PERRLA  ENT: moist mucous membranes  Neck: neck supple, trachea midline  Lungs: Good inspiratory effort, CTABL, no wheeze, no rhonchi, no rales  Heart: RRR, normal S1 and S2, no murmurs  GI: Soft, non-distended, very tender to palpation in epigastric region with guarding, no rebound, +BS  MSK: Full ROM bilaterally, 5/5 strength bilaterally, no edema noted  Skin: warm, dry  Psych: appropriate affect     Data    CBC:   Lab Results   Component Value Date    WBC 13.6 07/31/2020    RBC 3.99 07/31/2020    HGB 12.2 07/31/2020    HCT 34.8 07/31/2020    MCV 87.2 07/31/2020    MCH 30.0 07/31/2020    MCHC 34.0 07/31/2020    RDW 14.9 07/31/2020     07/31/2020    MPV 9.0 05/29/2015     CMP:    Lab Results   Component Value Date     07/31/2020    K 3.7 07/31/2020    CL 90 07/31/2020    CO2 32 07/31/2020    BUN 6 07/31/2020    CREATININE NOT DONE 07/31/2020    GFRAA NOT DONE 07/31/2020    LABGLOM NOT DONE 07/31/2020    GLUCOSE NOT DONE 07/31/2020    PROT 1.5 07/31/2020    LABALBU NOT DONE 07/31/2020    CALCIUM 8.0 07/31/2020    BILITOT NOT DONE 07/31/2020    ALKPHOS 88 07/31/2020    AST NOT DONE 07/31/2020    ALT NOT DONE 07/31/2020     FLP:    Lab Results   Component Value Date    TRIG >4,425 07/31/2020    HDL 14 07/31/2020    LDLCALC see below 07/31/2020       ASSESSMENT AND PLAN      # Acute pancreatitis likely 2/2 hypertriglyceridemia  - CT with evidence of acute pancreatitis  - pt with hx of alcohol abuse, but has not had a drink in a week  - TG >4400  - initially started on NS. Will start insulin drip and D5-1/2NS to maintain -200 and monitor TG q12 hours  - GI consulted  - pain, nausea control  - NPO except sips of water, ice chips. Will advance when pain improved    # Hyponatremia  - will monitor in setting of very high TG's  - holding HCTZ    # Hypokalemia  - will replace as needed    # DM  - discontinued ISS.  Starting insulin drip.  - holding metformin  -

## 2020-07-31 NOTE — CARE COORDINATION
[facility] [place] Case Management Initial Discharge Assessment    Met with patient to discuss discharge plan. PCP: Ginny Ferro,                                 Date of Last Visit: \"Over a Year\" but pt following up at [facility]    If no PCP, list provided? N/A    Discharge Planning    Living Arrangements: independently at home    Who do you live with? Mother    Who helps you with your care:  self    If lives at home:     Do you have any barriers navigating in your home? no    Patient can perform ADL? Yes    Current Services (outpatient and in home) :  None    Dialysis: No    Is transportation available to get to your appointments? Yes    DME Equipment:  no    Respiratory equipment: None    Respiratory provider:  no     Pharmacy:  yes -     Consult with Medication Assistance Program?  Yes      Patient agreeable to Mara ? N/A    Patient agreeable to SNF/Rehab? N/A    Other discharge needs identified? Other HELP for Medicaid application    Freedom of choice list provided with basic dialogue that supports the patient's individualized plan of care/goals and shares the quality data associated with the providers. Yes    Does Patient Have a High-Risk for Readmission Diagnosis (CHF, PN, MI, COPD)? No    The plan for Transition of Care is related to the following treatment goals:Resolve pain    Initial Discharge Plan? (Note: please see concurrent daily documentation for any updates after initial note). Pt is independent at home and will return. Namrata Quintero with HELP met with pt. He is eligible for [facility] and 2 weeks new meds.     The Patient and/or patient representative: patient was provided with choice of any post-acute providers for care and equipment and agrees with discharge plan  Yes    Electronically signed by FELICITAS Henson on 7/31/2020 at 12:15 PM

## 2020-07-31 NOTE — CONSULTS
[address]                      [facility], [address] [facility]                                  CONSULTATION    PATIENT NAME: Krystin Wilson                      :        1980  MED REC NO:   [id number]                            ROOM:       A533  ACCOUNT NO:   [de-identified]                           ADMIT DATE: 2020  PROVIDER:     Christian Higginbotham MD    CONSULT DATE:  2020    ENDOCRINE CONSULT    REFERRING PROVIDER:  Lillian Sanford DO    REASON FOR CONSULTATION:  Management of uncontrolled diabetes,  hypertriglyceridemia. CHIEF COMPLAINT AND HISTORY OF PRESENT ILLNESS:  The patient is a  42-year-old male with known history of diabetes for many years, admitted  with epigastric abdominal pain. Pain is in the epigastric area,  radiating to back, consistent with pancreatitis due to  hypertriglyceridemia. The patient's triglycerides were more than 4400. Started on IV fluids and IV insulin drip and _____. The patient also  has had diabetes with poor control. His A1c was elevated at 12.9. Prior A1c done 2 years ago was 7.0. The patient was only on metformin  for his diabetes, was taking 500 mg twice a day, was also on Vascepa for  hypertriglyceridemia 2 gm twice daily and was taking blood pressure  medication, lisinopril and hydrochlorothiazide. At this time, the  patient is in moderate amount of pain. Denies any prior cardiovascular  complications of diabetes including angina, heart disease, strokes. Reviewed chemistries; sodium was low at 123, potassium 3.7, chloride was  90, CO2 was 32, BUN was 6, cholesterol 613, triglyceride more than 4400. A1c was 12.9. Chemistries from yesterday were compatible. WBC count  was 14.6. CAT scan of the abdomen and pelvis reveals acute  pancreatitis, moderate hepatomegaly and steatosis. PAST MEDICAL HISTORY:  Significant for type 2 diabetes,  hypercholesteremia, hypertension, depression.     PAST SURGICAL Long-term A1c  goal of 6.5 or lower. Advised the patient he will be going home on four  insulin injections daily plus lipid lowering medication. Thank you for the consult.         Segun Lazaro MD    D: 07/31/2020 13:01:04       T: 07/31/2020 13:10:51     [email or web address]  Job#: [id number]     Doc#: [id number]    CC:

## 2020-07-31 NOTE — PROGRESS NOTES
Pt admitted into room 469. Pt vital signs are stable. Home medications reviewed. Pt is up independent in the room. Pt is currently NPO. Pt does state pain is getting better in abd but tender to touch. Bed in lowest position. Call light in reach. Will continue to monitor.

## 2020-07-31 NOTE — ED NOTES
Dr. Dorcas kumar to update patient on plan of care, test results.       Fabiola Garrett RN  07/30/20 4018

## 2020-07-31 NOTE — H&P
Hospitalist Group   History and Physical      CHIEF COMPLAINT: Epigastric pain    History of Present Illness:  47 y.o. male with a history of diabetes, hypertension, hyperlipidemia presents with epigastric pain. Patient woke up today and ate a bowl of cereal.  He was brushing his teeth when he started gagging. He started feeling pain in his upper abdomen but was not very severe. However, the pain continues to progress gets worse. He said that it was radiating to the back. He denied nausea or vomiting. He mentioned that he is usually constipated and no diarrhea. He denies any history of pancreatitis in the past.  Patient mentioned that he was going to the bathroom often and he was thirsty. He said that he is diabetic most likely these are due to his diabetes. He denies any fever or chills. He is smoker but no new cough or shortness of breath. He is taking medication for his blood pressure which includes lisinopril-hydrochlorothiazide. Patient mentioned that he has family history of gallstones from his mother side. He also mentioned that he used to drink alcohol on a regular basis up until last month when he started drinking less often. He said that when he drinks alcohol he drinks excessively. His last drink was during the weekend. REVIEW OF SYSTEMS:  Denied fevers at home , no chills, cp, sob, n/v, ha, vision/hearing changes, wt changes, hot/cold flashes, other open skin lesions, diarrhea, constipation, dysuria/hematuria unless noted in HPI. Complete ROS performed with the patient and is otherwise negative.       PMH:  Past Medical History:   Diagnosis Date    Depression     Diabetes mellitus ([facility] [address]     Hyperlipidemia     Hypertension        Surgical History:  Past Surgical History:   Procedure Laterality Date    COLONOSCOPY  6/12/15    w/polypectomy     UPPER GASTROINTESTINAL ENDOSCOPY  6/12/15    w/bx,dilation        Medications Prior to Admission:    Prior to edema  Musculoskeletal: normal range of motion, no joint swelling, deformity or tenderness  Neurologic: reflexes normal and symmetric, no cranial nerve deficit, gait, coordination and speech normal      LABS:  Recent Labs     07/30/20 1933 07/30/20 1945   NA  --  123*   K  --  3.0*   CL  --  87*   CO2  --  28   BUN  --  8   CREATININE  --  0.46*   GLUCOSE 353 283*   CALCIUM  --  8.7       Recent Labs     07/30/20 1945   WBC 14.6*   RBC 4.33*   HGB 12.5*   HCT 36.9*   MCV 85.3   MCH 30.0   MCHC 34.1   RDW 14.5   *       Recent Labs     07/30/20 1932 07/30/20 2219   POCGLU 353* 301*       CBC with Differential:    Lab Results   Component Value Date    WBC 14.6 07/30/2020    RBC 4.33 07/30/2020    HGB 12.5 07/30/2020    HCT 36.9 07/30/2020     07/30/2020    MCV 85.3 07/30/2020    MCH 30.0 07/30/2020    MCHC 34.1 07/30/2020    RDW 14.5 07/30/2020    LYMPHOPCT 7.6 07/30/2020    MONOPCT 10.5 07/30/2020    BASOPCT 0.2 07/30/2020    MONOSABS 1.5 07/30/2020    LYMPHSABS 1.1 07/30/2020    EOSABS 0.2 07/30/2020    BASOSABS 0.0 07/30/2020     CMP:    Lab Results   Component Value Date     07/30/2020    K 3.0 07/30/2020    CL 87 07/30/2020    CO2 28 07/30/2020    BUN 8 07/30/2020    CREATININE 0.46 07/30/2020    GFRAA >60.0 07/30/2020    LABGLOM >60.0 07/30/2020    GLUCOSE 283 07/30/2020    PROT 5.4 07/30/2020    LABALBU 3.5 07/30/2020    CALCIUM 8.7 07/30/2020    BILITOT 0.3 07/30/2020    ALKPHOS 104 07/30/2020    AST <5 07/30/2020    ALT <5 07/30/2020       Radiology: No results found. EKG: Sinus tachycardia    ASSESSMENT/ PLAN[de-identified]      Active Problems:    Acute pancreatitis  Resolved Problems:    * No resolved hospital problems. *      1.  Acute pancreatitis   Epigastric pain radiating to the back   Mildly elevated lipase, CT scan finding suggesting pancreatitis   No abscess or necrosis   Mild fever and mild leukocytosis   Treat with IV hydration, pain control   Do ultrasound gallbladder to check for stones   Patient used to be regular alcohol drinker- but last drink was few days ago but when he drinks he drinks excessively  2. Hyponatremia   Sodium 123- 125 after correction to hyperglycemia   Likely due to patient's diuretic-hydrochlorothiazide/lisinopril   Will hold medication   Currently on normal saline IV fluids for treating pancreatitis   Will monitor sodium closely  3. Hypokalemia   Potassium 3.0   Patient denied vomiting   Patient is on hydrochlorothiazide but is combined with lisinopril   Will replace potassium and monitor  4. Fever/leukocytosis-Sirs positive   WBC 14.6, temperature 100.8   No sign of abscess or necrosis of the pancreas on CT scan of the abdomen   No source of infection identified   Send blood cultures and monitor vitals and WBCs   IV fluids   If fever persists, will start patient on antibiotic and consult ID.  5. Diabetes   On metformin at home   Will start patient on insulin sliding scale   Check A1c and monitor  6. Hypertension history   On lisinopril-hydrochlorothiazide   Blood pressure is controlled currently   Will hold medication due to the hyponatremia and monitor blood pressure   BP medication might need to be switched upon discharge    Code Status: Full  DVT prophylaxis: Lovenox         Electronically signed by Marcia Campos MD on 7/30/2020 at 11:01 PM      NOTE: This report was transcribed using voice recognition software. Every effort was made to ensure accuracy; however, inadvertent computerized transcription errors may be present.

## 2020-07-31 NOTE — ED NOTES
Lab called to notify of patient's K+ 3.0, Dr. Debra Womack made aware.       Randy Paul RN  07/30/20 2691

## 2020-07-31 NOTE — CONSULTS
Inpatient consult to GI  Consult performed by: Monalisa Douglas MD  Consult ordered by: [facility] FERN[facility]HDO SANDRA          Patient Name: Gabriel Cueva  Admit Date: 2020  7:25 PM  MR #: [id number]  : 1980    Attending Physician: ERIK GUEVARA DO  Reason for consult: pancreatitis    History of Presenting Illness:      Gabriel Cueva is a 47 y.o. male on hospital day 1 with a history of depression, DM 2, hyperlipidemia, hypertension. Past surgical history was reviewed and is noncontributory to his current illness. Family history was reviewed and is negative for GI malignancies. Social history patient reports EtOH use drinks a sixpack of beer up to 4 to 5 days a week, current every day smoker, denies illicit drug use. Teodoro Morales History Obtained From:  patient, electronic medical record  GI consult for pancreatitis-patient was admitted with sudden onset of epigastric pain with radiation to his back,  started 24 hours ago rates pain at 10 out of 10, no exacerbating or relieving factors. Associated with nausea and vomiting, no hematemesis, no fever or chills, or diarrhea. CT of the abdomen and pelvis shows moderate hepatomegaly and steatosis and findings consistent with mild uncomplicated acute pancreatitis. On arrival noted leukocytosis, and triglycerides greater than 4000, was started on insulin infusion and IV fluids. Patient denies any previous history of pancreatitis.      History:      Past Medical History:   Diagnosis Date    Depression     Diabetes mellitus ([address]     Hyperlipidemia     Hypertension      Past Surgical History:   Procedure Laterality Date    COLONOSCOPY  6/12/15    w/polypectomy     UPPER GASTROINTESTINAL ENDOSCOPY  6/12/15    w/bx,dilation      Family History  Family History   Problem Relation Age of Onset    High Blood Pressure Father      [] Unable to obtain due to ventilated and/ or neurologic status  Social History     Socioeconomic History    Marital status: Single     Spouse name: Not on file    Number of children: Not on file    Years of education: Not on file    Highest education level: Not on file   Occupational History    Not on file   Social Needs    Financial resource strain: Not on file    Food insecurity     Worry: Not on file     Inability: Not on file    Transportation needs     Medical: Not on file     Non-medical: Not on file   Tobacco Use    Smoking status: Current Every Day Smoker     Packs/day: 0.50     Last attempt to quit: 2014     Years since quittin.9    Smokeless tobacco: Never Used   Substance and Sexual Activity    Alcohol use: Yes     Comment: weekends    Drug use: No    Sexual activity: Not on file   Lifestyle    Physical activity     Days per week: Not on file     Minutes per session: Not on file    Stress: Not on file   Relationships    Social connections     Talks on phone: Not on file     Gets together: Not on file     Attends Restorationism service: Not on file     Active member of club or organization: Not on file     Attends meetings of clubs or organizations: Not on file     Relationship status: Not on file    Intimate partner violence     Fear of current or ex partner: Not on file     Emotionally abused: Not on file     Physically abused: Not on file     Forced sexual activity: Not on file   Other Topics Concern    Not on file   Social History Narrative    Not on file      [] Unable to obtain due to ventilated and/ or neurologic status    Home Medications:      Medications Prior to Admission: Icosapent Ethyl (VASCEPA) 1 g CAPS capsule, Take 2 capsules by mouth daily  metFORMIN (GLUCOPHAGE) 500 MG tablet, Take 500 mg by mouth 2 times daily (with meals)   lisinopril-hydrochlorothiazide (PRINZIDE;ZESTORETIC) 20-12.5 MG per tablet, Take 1 tablet by mouth daily  [DISCONTINUED] naproxen (NAPROSYN) 500 MG tablet, Take 1 tablet by mouth 2 times daily (with meals)  [DISCONTINUED] fenofibrate (TRICOR) 145 MG tablet, Take 1 tablet by mouth daily  [DISCONTINUED] buPROPion (WELLBUTRIN XL) 300 MG XL tablet, Take 1 tablet by mouth every morning    Current Hospital Medications:   Scheduled Meds:   atorvastatin  80 mg Oral Nightly    fenofibrate  160 mg Oral Daily    omega-3 acid ethyl esters  2 g Oral BID    sodium chloride flush  10 mL Intravenous 2 times per day    enoxaparin  40 mg Subcutaneous Daily     Continuous Infusions:   dextrose 5 % and 0.45 % NaCl 200 mL/hr at 07/31/20 0955    insulin 10.6 Units/hr (07/31/20 1148)    dextrose      dextrose       PRN Meds:.morphine, HYDROmorphone, glucose, dextrose, glucagon (rDNA), dextrose, sodium chloride flush, acetaminophen **OR** acetaminophen, polyethylene glycol, promethazine **OR** ondansetron, glucose, dextrose, glucagon (rDNA), dextrose, ketorolac   dextrose 5 % and 0.45 % NaCl 200 mL/hr at 07/31/20 0955    insulin 10.6 Units/hr (07/31/20 1148)    dextrose      dextrose        Allergies:   No Known Allergies   Review of Systems:       [x] CV, Resp, Neuro, , and all other systems reviewed and negative other than listed in HPI.         Objective Findings:     Vitals:   Vitals:    07/30/20 2133 07/30/20 2217 07/30/20 2233 07/31/20 0101   BP: (!) 138/91 121/67 (!) 161/94    Pulse: 89 88 89    Resp: 20 20 18    Temp:   99 °F (37.2 °C)    TempSrc:   Oral    SpO2: 97% 97% 98%    Weight:    252 lb 3.3 oz (114.4 kg)   Height:            Physical Examination:  General: alert   HEENT: Normocephalic, no scleral icterus. Neck: No JVD. Heart: Regular, no murmur, no rub/gallop. No RV heave. Lungs: Clear to ascultation, no rales/wheezing/rhonchi. Good chest wall excursion. Abdomen: Appearance:, no Distension , Soft , generalized tenderness, Bowel sounds normal Extremities: No clubbing/cyanosis, no edema. Skin: Warm, dry, normal turgor, no rash, no bruise, no petichiae. Neuro: No myoclonus or tremor.    Psych: Normal affect    Results/ Medications reviewed 7/31/2020, 12:23 PM Laboratory, Microbiology, Pathology, Radiology, Cardiology, Medications and Transcriptions reviewed  Scheduled Meds:   atorvastatin  80 mg Oral Nightly    fenofibrate  160 mg Oral Daily    omega-3 acid ethyl esters  2 g Oral BID    sodium chloride flush  10 mL Intravenous 2 times per day    enoxaparin  40 mg Subcutaneous Daily     Continuous Infusions:   dextrose 5 % and 0.45 % NaCl 200 mL/hr at 07/31/20 0955    insulin 10.6 Units/hr (07/31/20 1148)    dextrose      dextrose         Recent Labs     07/30/20 1945 07/31/20  0641   WBC 14.6* 13.6*   HGB 12.5* 12.2*   HCT 36.9* 34.8*   MCV 85.3 87.2   * 338     Recent Labs     07/30/20 1945 07/31/20  0735   * 123*   K 3.0* 3.7   CL 87* 90*   CO2 28 32*   BUN 8 6   CREATININE 0.46* NOT DONE     Recent Labs     07/30/20 1945 07/31/20  0735   AST <5 NOT DONE   ALT <5 NOT DONE   BILITOT 0.3 NOT DONE   ALKPHOS 104 88     Recent Labs     07/30/20 1945   LIPASE 123*     Recent Labs     07/30/20 1945 07/31/20  0735   PROT 5.4* 1.5*     Ct Abdomen Pelvis W Iv Contrast Additional Contrast? None    Result Date: 7/31/2020  CT ABDOMEN PELVIS W IV CONTRAST: 7/30/2020 CLINICAL HISTORY:  epigastric pain . COMPARISON: 3/27/2007. TECHNIQUE: Spiral images were obtained of the abdomen and pelvis after the uneventful intravenous administration of approximately 100 mL of Isovue-370 contrast. All CT scans at this facility use dose modulation, iterative reconstruction, and/or weight based dosing when appropriate to reduce radiation dose to as low as reasonably achievable. FINDINGS: Mild ill-defined inflammation is noted surrounding the pancreas, suspicious for acute pancreatitis. There is no fluid collection, abnormal bowel dilatation, or other complication identified. The pancreas is otherwise unremarkable. The liver is moderately enlarged with moderate fatty infiltration. Mild colonic diverticulosis is again noted, without evidence of diverticulitis.  The gallbladder, spleen, adrenal glands, kidneys, great vessels, small bowel loops, appendix, urinary bladder, and additional images of pelvis appear within normal limits. The visualized lung bases and musculoskeletal structures are unremarkable. FINDINGS CONSISTENT WITH MILD UNCOMPLICATED ACUTE PANCREATITIS. MODERATE HEPATOMEGALY AND STEATOSIS. Us Abdomen Limited    Result Date: 7/31/2020  US ABDOMEN LIMITED : 7/30/2020 CLINICAL HISTORY:  Pancreatitis? check for gallstones . COMPARISON: CT abdomen pelvis 7/30/2020. TECHNIQUE: Transabdominal ultrasound of the right upper quadrant was performed. FINDINGS: The liver is moderately enlarged with moderate increased echogenicity consistent with fatty infiltration. The gallbladder is unremarkable in appearance. The pancreas is extremely poorly visualized, but otherwise unremarkable. There is no significant gallbladder distention, wall thickening, calculi, pericholecystic fluid, biliary dilatation, ascites, or other of concern identified. The common duct measures approximately 3 mm at the angy hepatis. NO EVIDENCE OF CHOLELITHIASIS OR SIGNIFICANT BILIARY DILATATION. MODERATE HEPATOMEGALY AND STEATOSIS. Impression:   53-year-old male admitted with sudden onset of epigastric & left upper quadrant pain radiating to his back associated with nausea and vomiting, no fever or chills, noted mild leukocytosis, CT of the abdomen and pelvis shows acute mild pancreatitis, has a history of EtOH use drinks a sixpack of beer up to 4 times a week, also noted triglycerides greater than 4000, calcium 8,  was started on IV fluids and insulin infusion.    Plan:   Acute pancreatitis secondary to elevated triglyceride/exacerbated by ETOH  -Continue course of care  -Okay for clear liquids   -continue IVF @ 200 hour  - pain management  -Complete smoking and alcohol cessation  -continue insulin infusion check triglycerides Q 12 h and d/c gtt when triglycerides are less than 500  -if clinical condition deteriorates consider transfer to [facility] for Apheresis  Comments: Thank you for allowing us to participate in the care of this patient. Will continue to follow. Please call if questions or concerns arise. A/P  Agree regarding assessment and plan. Reviewed chart examined patient. Patient admitted owing to abdominal pain and diagnosed with acute pancreatitis and found to have triglycerides above 4000. Noted history of diabetes mellitus as well as alcohol use. Continue pain control, IV fluid, insulin infusion. Further recommendations pending clinical course. Monalisa Douglas MD  Please note this report has been partially produced using speech recognition software and may cause contain errors related to that system including grammar, punctuation and spelling as well as words and phrases that may seem inappropriate. If there are questions or concerns please feel free to contact me to clarify.

## 2020-08-01 LAB
ALBUMIN SERPL-MCNC: 2.8 G/DL (ref 3.5–4.6)
ALP BLD-CCNC: 75 U/L (ref 35–104)
ALT SERPL-CCNC: <5 U/L (ref 0–41)
ANION GAP SERPL CALCULATED.3IONS-SCNC: 14 MEQ/L (ref 9–15)
AST SERPL-CCNC: <5 U/L (ref 0–40)
BASOPHILS ABSOLUTE: 0.1 K/UL (ref 0–0.2)
BASOPHILS RELATIVE PERCENT: 0.4 %
BILIRUB SERPL-MCNC: 0.5 MG/DL (ref 0.2–0.7)
BUN BLDV-MCNC: 3 MG/DL (ref 6–20)
CALCIUM SERPL-MCNC: 8.3 MG/DL (ref 8.5–9.9)
CHLORIDE BLD-SCNC: 96 MEQ/L (ref 95–107)
CHOLESTEROL, TOTAL: 486 MG/DL (ref 0–199)
CO2: 18 MEQ/L (ref 20–31)
CREAT SERPL-MCNC: 0.49 MG/DL (ref 0.7–1.2)
EOSINOPHILS ABSOLUTE: 0.1 K/UL (ref 0–0.7)
EOSINOPHILS RELATIVE PERCENT: 0.4 %
GFR AFRICAN AMERICAN: >60
GFR AFRICAN AMERICAN: >60
GFR NON-AFRICAN AMERICAN: >60
GFR NON-AFRICAN AMERICAN: >60
GLOBULIN: 4 G/DL (ref 2.3–3.5)
GLUCOSE BLD-MCNC: 133 MG/DL (ref 60–115)
GLUCOSE BLD-MCNC: 139 MG/DL (ref 60–115)
GLUCOSE BLD-MCNC: 142 MG/DL (ref 60–115)
GLUCOSE BLD-MCNC: 145 MG/DL (ref 60–115)
GLUCOSE BLD-MCNC: 152 MG/DL (ref 60–115)
GLUCOSE BLD-MCNC: 154 MG/DL (ref 70–99)
GLUCOSE BLD-MCNC: 164 MG/DL (ref 60–115)
GLUCOSE BLD-MCNC: 167 MG/DL (ref 60–115)
GLUCOSE BLD-MCNC: 173 MG/DL (ref 60–115)
GLUCOSE BLD-MCNC: 176 MG/DL (ref 60–115)
GLUCOSE BLD-MCNC: 178 MG/DL (ref 60–115)
GLUCOSE BLD-MCNC: 184 MG/DL (ref 60–115)
GLUCOSE BLD-MCNC: 190 MG/DL (ref 60–115)
GLUCOSE BLD-MCNC: 192 MG/DL (ref 60–115)
GLUCOSE BLD-MCNC: 193 MG/DL (ref 60–115)
HCT VFR BLD CALC: 34.3 % (ref 42–52)
HDLC SERPL-MCNC: 12 MG/DL (ref 40–59)
HEMOGLOBIN: 12.4 G/DL (ref 14–18)
LDL CHOLESTEROL CALCULATED: ABNORMAL MG/DL (ref 0–129)
LYMPHOCYTES ABSOLUTE: 1.2 K/UL (ref 1–4.8)
LYMPHOCYTES RELATIVE PERCENT: 7.2 %
MAGNESIUM: 1.7 MG/DL (ref 1.7–2.4)
MCH RBC QN AUTO: 31.1 PG (ref 27–31.3)
MCHC RBC AUTO-ENTMCNC: 36.2 % (ref 33–37)
MCV RBC AUTO: 86 FL (ref 80–100)
MONOCYTES ABSOLUTE: 0.9 K/UL (ref 0.2–0.8)
MONOCYTES RELATIVE PERCENT: 5.4 %
NEUTROPHILS ABSOLUTE: 13.8 K/UL (ref 1.4–6.5)
NEUTROPHILS RELATIVE PERCENT: 86.6 %
PDW BLD-RTO: 14.9 % (ref 11.5–14.5)
PERFORMED ON: ABNORMAL
PLATELET # BLD: 316 K/UL (ref 130–400)
POC CREATININE: 0.8 MG/DL (ref 0.9–1.3)
POC SAMPLE TYPE: ABNORMAL
POTASSIUM SERPL-SCNC: 4.2 MEQ/L (ref 3.4–4.9)
RBC # BLD: 3.99 M/UL (ref 4.7–6.1)
SLIDE REVIEW: ABNORMAL
SODIUM BLD-SCNC: 128 MEQ/L (ref 135–144)
TOTAL PROTEIN: 6.8 G/DL (ref 6.3–8)
TRIGL SERPL-MCNC: 1790 MG/DL (ref 0–150)
TRIGL SERPL-MCNC: 2246 MG/DL (ref 0–150)
TRIGL SERPL-MCNC: 2490 MG/DL (ref 0–150)
TRIGL SERPL-MCNC: 2736 MG/DL (ref 0–150)
WBC # BLD: 16 K/UL (ref 4.8–10.8)

## 2020-08-01 PROCEDURE — 6360000002 HC RX W HCPCS: Performed by: INTERNAL MEDICINE

## 2020-08-01 PROCEDURE — 2580000003 HC RX 258: Performed by: INTERNAL MEDICINE

## 2020-08-01 PROCEDURE — 6370000000 HC RX 637 (ALT 250 FOR IP): Performed by: INTERNAL MEDICINE

## 2020-08-01 PROCEDURE — 83735 ASSAY OF MAGNESIUM: CPT

## 2020-08-01 PROCEDURE — 80053 COMPREHEN METABOLIC PANEL: CPT

## 2020-08-01 PROCEDURE — 85025 COMPLETE CBC W/AUTO DIFF WBC: CPT

## 2020-08-01 PROCEDURE — 80061 LIPID PANEL: CPT

## 2020-08-01 PROCEDURE — 1210000000 HC MED SURG R&B

## 2020-08-01 PROCEDURE — 36415 COLL VENOUS BLD VENIPUNCTURE: CPT

## 2020-08-01 RX ORDER — ACETAMINOPHEN 325 MG/1
650 TABLET ORAL EVERY 4 HOURS PRN
Status: DISCONTINUED | OUTPATIENT
Start: 2020-08-01 | End: 2020-08-04 | Stop reason: HOSPADM

## 2020-08-01 RX ORDER — ACETAMINOPHEN 650 MG/1
650 SUPPOSITORY RECTAL EVERY 6 HOURS PRN
Status: DISCONTINUED | OUTPATIENT
Start: 2020-08-01 | End: 2020-08-04 | Stop reason: HOSPADM

## 2020-08-01 RX ORDER — BACITRACIN, NEOMYCIN, POLYMYXIN B 400; 3.5; 5 [USP'U]/G; MG/G; [USP'U]/G
OINTMENT TOPICAL PRN
Status: DISCONTINUED | OUTPATIENT
Start: 2020-08-01 | End: 2020-08-04 | Stop reason: HOSPADM

## 2020-08-01 RX ADMIN — FOLIC ACID 1 MG: 1 TABLET ORAL at 07:46

## 2020-08-01 RX ADMIN — OMEGA-3-ACID ETHYL ESTERS 2 G: 1 CAPSULE, LIQUID FILLED ORAL at 19:46

## 2020-08-01 RX ADMIN — ACETAMINOPHEN 650 MG: 325 TABLET ORAL at 07:47

## 2020-08-01 RX ADMIN — Medication 2 MG: at 05:44

## 2020-08-01 RX ADMIN — PIPERACILLIN AND TAZOBACTAM 3.38 G: 3; .375 INJECTION, POWDER, LYOPHILIZED, FOR SOLUTION INTRAVENOUS at 18:05

## 2020-08-01 RX ADMIN — DEXTROSE AND SODIUM CHLORIDE: 5; 450 INJECTION, SOLUTION INTRAVENOUS at 01:36

## 2020-08-01 RX ADMIN — ATORVASTATIN CALCIUM 80 MG: 80 TABLET, FILM COATED ORAL at 19:47

## 2020-08-01 RX ADMIN — Medication 2 MG: at 15:45

## 2020-08-01 RX ADMIN — DEXTROSE AND SODIUM CHLORIDE: 5; 450 INJECTION, SOLUTION INTRAVENOUS at 19:47

## 2020-08-01 RX ADMIN — PIPERACILLIN AND TAZOBACTAM 3.38 G: 3; .375 INJECTION, POWDER, LYOPHILIZED, FOR SOLUTION INTRAVENOUS at 09:53

## 2020-08-01 RX ADMIN — ENOXAPARIN SODIUM 40 MG: 40 INJECTION SUBCUTANEOUS at 07:47

## 2020-08-01 RX ADMIN — Medication 2 MG: at 10:00

## 2020-08-01 RX ADMIN — ACETAMINOPHEN 650 MG: 325 TABLET ORAL at 18:04

## 2020-08-01 RX ADMIN — SODIUM CHLORIDE 13 UNITS/HR: 9 INJECTION, SOLUTION INTRAVENOUS at 15:43

## 2020-08-01 RX ADMIN — ACETAMINOPHEN 650 MG: 325 TABLET ORAL at 13:58

## 2020-08-01 RX ADMIN — HYDROMORPHONE HYDROCHLORIDE 0.5 MG: 1 INJECTION, SOLUTION INTRAMUSCULAR; INTRAVENOUS; SUBCUTANEOUS at 13:57

## 2020-08-01 RX ADMIN — DEXTROSE AND SODIUM CHLORIDE: 5; 450 INJECTION, SOLUTION INTRAVENOUS at 06:46

## 2020-08-01 RX ADMIN — THERA TABS 1 TABLET: TAB at 07:46

## 2020-08-01 RX ADMIN — Medication 100 MG: at 07:46

## 2020-08-01 RX ADMIN — Medication 2 MG: at 19:46

## 2020-08-01 RX ADMIN — Medication 10 ML: at 19:47

## 2020-08-01 RX ADMIN — SODIUM CHLORIDE 14.63 UNITS/HR: 9 INJECTION, SOLUTION INTRAVENOUS at 01:36

## 2020-08-01 RX ADMIN — HYDROMORPHONE HYDROCHLORIDE 0.5 MG: 1 INJECTION, SOLUTION INTRAMUSCULAR; INTRAVENOUS; SUBCUTANEOUS at 00:31

## 2020-08-01 RX ADMIN — ACETAMINOPHEN 650 MG: 325 TABLET ORAL at 00:30

## 2020-08-01 RX ADMIN — FENOFIBRATE 160 MG: 160 TABLET ORAL at 07:46

## 2020-08-01 RX ADMIN — ACETAMINOPHEN 650 MG: 325 TABLET ORAL at 23:51

## 2020-08-01 ASSESSMENT — PAIN SCALES - GENERAL
PAINLEVEL_OUTOF10: 5
PAINLEVEL_OUTOF10: 7
PAINLEVEL_OUTOF10: 5
PAINLEVEL_OUTOF10: 5
PAINLEVEL_OUTOF10: 7
PAINLEVEL_OUTOF10: 6
PAINLEVEL_OUTOF10: 7
PAINLEVEL_OUTOF10: 8
PAINLEVEL_OUTOF10: 7

## 2020-08-01 NOTE — PROGRESS NOTES
Department of Internal Medicine  General Internal Medicine  Attending Progress Note      SUBJECTIVE:  Pt seen and examined. Still with pain.  Resting this AM    OBJECTIVE      Medications    Current Facility-Administered Medications: piperacillin-tazobactam (ZOSYN) 3.375 g in dextrose 5 % 50 mL IVPB extended infusion (mini-bag), 3.375 g, Intravenous, Q8H  morphine (PF) injection 2 mg, 2 mg, Intravenous, Q3H PRN  HYDROmorphone (DILAUDID) injection 0.5 mg, 0.5 mg, Intravenous, Q4H PRN  dextrose 5 % and 0.45 % sodium chloride infusion, , Intravenous, Continuous  insulin regular (HUMULIN R;NOVOLIN R) 100 Units in sodium chloride 0.9 % 100 mL infusion, 0.1 Units/hr, Intravenous, Continuous  glucose (GLUTOSE) 40 % oral gel 15 g, 15 g, Oral, PRN  dextrose 50 % IV solution, 12.5 g, Intravenous, PRN  glucagon (rDNA) injection 1 mg, 1 mg, Intramuscular, PRN  dextrose 5 % solution, 100 mL/hr, Intravenous, PRN  atorvastatin (LIPITOR) tablet 80 mg, 80 mg, Oral, Nightly  fenofibrate (TRIGLIDE) tablet 160 mg, 160 mg, Oral, Daily  omega-3 acid ethyl esters (LOVAZA) capsule 2 g, 2 g, Oral, BID  vitamin B-1 (THIAMINE) tablet 100 mg, 100 mg, Oral, Daily  multivitamin 1 tablet, 1 tablet, Oral, Daily  folic acid (FOLVITE) tablet 1 mg, 1 mg, Oral, Daily  LORazepam (ATIVAN) injection 0.5 mg, 0.5 mg, Intravenous, Q6H PRN  sodium chloride flush 0.9 % injection 10 mL, 10 mL, Intravenous, 2 times per day  sodium chloride flush 0.9 % injection 10 mL, 10 mL, Intravenous, PRN  acetaminophen (TYLENOL) tablet 650 mg, 650 mg, Oral, Q6H PRN **OR** acetaminophen (TYLENOL) suppository 650 mg, 650 mg, Rectal, Q6H PRN  polyethylene glycol (GLYCOLAX) packet 17 g, 17 g, Oral, Daily PRN  promethazine (PHENERGAN) tablet 12.5 mg, 12.5 mg, Oral, Q6H PRN **OR** ondansetron (ZOFRAN) injection 4 mg, 4 mg, Intravenous, Q6H PRN  enoxaparin (LOVENOX) injection 40 mg, 40 mg, Subcutaneous, Daily  glucose (GLUTOSE) 40 % oral gel 15 g, 15 g, Oral, PRN  dextrose 50 % IV solution, 12.5 g, Intravenous, PRN  glucagon (rDNA) injection 1 mg, 1 mg, Intramuscular, PRN  dextrose 5 % solution, 100 mL/hr, Intravenous, PRN  ketorolac (TORADOL) injection 15 mg, 15 mg, Intravenous, Q6H PRN  Physical    VITALS:  BP (!) 149/82   Pulse 103   Temp 100.4 °F (38 °C) (Oral)   Resp 18   Ht 6' 2\" (1.88 m)   Wt 252 lb 3.3 oz (114.4 kg)   SpO2 96%   BMI 32.38 kg/m²   Constitutional: Lying in bed comfortably  Head: Normocephalic, atraumatic  Eyes: EOMI, PERRLA  ENT: moist mucous membranes  Neck: neck supple, trachea midline  Lungs: Good inspiratory effort, CTABL, no wheeze, no rhonchi, no rales  Heart: RRR, normal S1 and S2, no murmurs  GI: Soft, non-distended, very tender to palpation in epigastric region with guarding, no rebound, +BS  MSK: Full ROM bilaterally, 5/5 strength bilaterally, no edema noted  Skin: warm, dry  Psych: appropriate affect     Data    CBC:   Lab Results   Component Value Date    WBC 16.0 08/01/2020    RBC 3.99 08/01/2020    HGB 12.4 08/01/2020    HCT 34.3 08/01/2020    MCV 86.0 08/01/2020    MCH 31.1 08/01/2020    MCHC 36.2 08/01/2020    RDW 14.9 08/01/2020     08/01/2020    MPV 9.0 05/29/2015     CMP:    Lab Results   Component Value Date     08/01/2020    K 4.2 08/01/2020    CL 96 08/01/2020    CO2 18 08/01/2020    BUN 3 08/01/2020    CREATININE 0.49 08/01/2020    GFRAA >60.0 08/01/2020    LABGLOM >60.0 08/01/2020    GLUCOSE 154 08/01/2020    PROT 6.8 08/01/2020    LABALBU 2.8 08/01/2020    CALCIUM 8.3 08/01/2020    BILITOT 0.5 08/01/2020    ALKPHOS 75 08/01/2020    AST <5 08/01/2020    ALT <5 08/01/2020     FLP:    Lab Results   Component Value Date    TRIG 2,490 08/01/2020    HDL 12 08/01/2020    LDLCALC see below 08/01/2020       ASSESSMENT AND PLAN      # Acute pancreatitis likely 2/2 hypertriglyceridemia  - CT with evidence of acute pancreatitis  - pt with hx of alcohol abuse, but has not had a drink in a week  - TG >4400 on admission.  Today 4434  - initially started on NS. Will start insulin drip and D5-1/2NS to maintain -200 and monitor TG q12 hours. Will discontinue insulin when TG<500  - GI consulted  - pain, nausea control  - NPO except sips of water, ice chips. Will advance when pain improved    # Sepsis with unknown source  - febrile, leukocytosis  - blood cultures drawn and pending  - started on zosyn    # Hyponatremia  - will monitor in setting of very high TG's  - holding HCTZ    # Hypokalemia  - will replace as needed    # DM  - discontinued ISS. Starting insulin drip.  - holding metformin  - uncontrolled. A1C 11.8  - will consult endocrinology    DVT: Lovenox    Disposition: Pt with TG induced pancreatitis and sepsis on zosyn, Insulin drip and D5. GI and endocrinology consulted. Anticipated length of stay greater than 48 hours.       Tanya Justin,   Internal Medicine

## 2020-08-01 NOTE — PROGRESS NOTES
Pt assessed and charted on by this RN. Pt currently on Insulin drip. BSL steady 190s. Within target range. Vital signs are stable. Pt complaining of consistent 7 out of 10 pain. Pain medication administered per MAR. Denies any nausea at this time. Bed in lowest position. Call light in reach. Will continue to monitor. 0600 - Pt currently Q2 hours blood sugar checks. Multiplier at this time is 0. 11. Last blood sugar was 152. Pt had emesis of 100ml. Mostly clear green to yellow with thick mucous.

## 2020-08-02 LAB
ALBUMIN SERPL-MCNC: 2.8 G/DL (ref 3.5–4.6)
ALP BLD-CCNC: 86 U/L (ref 35–104)
ALT SERPL-CCNC: <5 U/L (ref 0–41)
ANION GAP SERPL CALCULATED.3IONS-SCNC: 13 MEQ/L (ref 9–15)
ANISOCYTOSIS: ABNORMAL
AST SERPL-CCNC: <5 U/L (ref 0–40)
BACTERIA: NEGATIVE /HPF
BASOPHILS ABSOLUTE: 0 K/UL (ref 0–0.2)
BASOPHILS RELATIVE PERCENT: 1.8 %
BILIRUB SERPL-MCNC: 0.4 MG/DL (ref 0.2–0.7)
BILIRUBIN URINE: NEGATIVE
BLOOD, URINE: NEGATIVE
BUN BLDV-MCNC: 3 MG/DL (ref 6–20)
CALCIUM SERPL-MCNC: 8.2 MG/DL (ref 8.5–9.9)
CHLORIDE BLD-SCNC: 99 MEQ/L (ref 95–107)
CHOLESTEROL, TOTAL: 424 MG/DL (ref 0–199)
CLARITY: CLEAR
CO2: 22 MEQ/L (ref 20–31)
COLOR: YELLOW
CREAT SERPL-MCNC: 0.68 MG/DL (ref 0.7–1.2)
EOSINOPHILS ABSOLUTE: 0.5 K/UL (ref 0–0.7)
EOSINOPHILS RELATIVE PERCENT: 4 %
EPITHELIAL CELLS, UA: NORMAL /HPF (ref 0–5)
GFR AFRICAN AMERICAN: >60
GFR NON-AFRICAN AMERICAN: >60
GLOBULIN: 3.7 G/DL (ref 2.3–3.5)
GLUCOSE BLD-MCNC: 119 MG/DL (ref 60–115)
GLUCOSE BLD-MCNC: 123 MG/DL (ref 60–115)
GLUCOSE BLD-MCNC: 124 MG/DL (ref 60–115)
GLUCOSE BLD-MCNC: 124 MG/DL (ref 70–99)
GLUCOSE BLD-MCNC: 132 MG/DL (ref 60–115)
GLUCOSE BLD-MCNC: 133 MG/DL (ref 60–115)
GLUCOSE BLD-MCNC: 142 MG/DL (ref 60–115)
GLUCOSE BLD-MCNC: 164 MG/DL (ref 60–115)
GLUCOSE BLD-MCNC: 172 MG/DL (ref 60–115)
GLUCOSE BLD-MCNC: 174 MG/DL (ref 60–115)
GLUCOSE BLD-MCNC: 191 MG/DL (ref 60–115)
GLUCOSE BLD-MCNC: 301 MG/DL (ref 60–115)
GLUCOSE URINE: 100 MG/DL
HCT VFR BLD CALC: 31.2 % (ref 42–52)
HDLC SERPL-MCNC: 12 MG/DL (ref 40–59)
HEMOGLOBIN: 10.8 G/DL (ref 14–18)
HYALINE CASTS: NORMAL /HPF (ref 0–5)
KETONES, URINE: NEGATIVE MG/DL
LDL CHOLESTEROL CALCULATED: ABNORMAL MG/DL (ref 0–129)
LEUKOCYTE ESTERASE, URINE: NEGATIVE
LYMPHOCYTES ABSOLUTE: 0.5 K/UL (ref 1–4.8)
LYMPHOCYTES RELATIVE PERCENT: 4 %
MAGNESIUM: 1.9 MG/DL (ref 1.7–2.4)
MCH RBC QN AUTO: 30.2 PG (ref 27–31.3)
MCHC RBC AUTO-ENTMCNC: 34.5 % (ref 33–37)
MCV RBC AUTO: 87.4 FL (ref 80–100)
MONOCYTES ABSOLUTE: 0.1 K/UL (ref 0.2–0.8)
MONOCYTES RELATIVE PERCENT: 0.9 %
NEUTROPHILS ABSOLUTE: 10.9 K/UL (ref 1.4–6.5)
NEUTROPHILS RELATIVE PERCENT: 90 %
NITRITE, URINE: NEGATIVE
PDW BLD-RTO: 14.6 % (ref 11.5–14.5)
PERFORMED ON: ABNORMAL
PH UA: 6 (ref 5–9)
PLATELET # BLD: 283 K/UL (ref 130–400)
PLATELET SLIDE REVIEW: ADEQUATE
POTASSIUM SERPL-SCNC: 3.2 MEQ/L (ref 3.4–4.9)
PROTEIN UA: 30 MG/DL
RBC # BLD: 3.57 M/UL (ref 4.7–6.1)
RBC UA: NORMAL /HPF (ref 0–5)
SODIUM BLD-SCNC: 134 MEQ/L (ref 135–144)
SPECIFIC GRAVITY UA: 1.01 (ref 1–1.03)
TOTAL PROTEIN: 6.5 G/DL (ref 6.3–8)
TRIGL SERPL-MCNC: 1143 MG/DL (ref 0–150)
TRIGL SERPL-MCNC: 1503 MG/DL (ref 0–150)
TRIGL SERPL-MCNC: 1566 MG/DL (ref 0–150)
UROBILINOGEN, URINE: 1 E.U./DL
WBC # BLD: 12.1 K/UL (ref 4.8–10.8)
WBC UA: NORMAL /HPF (ref 0–5)

## 2020-08-02 PROCEDURE — 2580000003 HC RX 258: Performed by: INTERNAL MEDICINE

## 2020-08-02 PROCEDURE — 99232 SBSQ HOSP IP/OBS MODERATE 35: CPT | Performed by: INTERNAL MEDICINE

## 2020-08-02 PROCEDURE — 99254 IP/OBS CNSLTJ NEW/EST MOD 60: CPT | Performed by: INTERNAL MEDICINE

## 2020-08-02 PROCEDURE — 87086 URINE CULTURE/COLONY COUNT: CPT

## 2020-08-02 PROCEDURE — 85025 COMPLETE CBC W/AUTO DIFF WBC: CPT

## 2020-08-02 PROCEDURE — 6370000000 HC RX 637 (ALT 250 FOR IP): Performed by: INTERNAL MEDICINE

## 2020-08-02 PROCEDURE — 36415 COLL VENOUS BLD VENIPUNCTURE: CPT

## 2020-08-02 PROCEDURE — 80061 LIPID PANEL: CPT

## 2020-08-02 PROCEDURE — 80053 COMPREHEN METABOLIC PANEL: CPT

## 2020-08-02 PROCEDURE — 81001 URINALYSIS AUTO W/SCOPE: CPT

## 2020-08-02 PROCEDURE — 1210000000 HC MED SURG R&B

## 2020-08-02 PROCEDURE — 83735 ASSAY OF MAGNESIUM: CPT

## 2020-08-02 PROCEDURE — 6360000002 HC RX W HCPCS: Performed by: INTERNAL MEDICINE

## 2020-08-02 RX ADMIN — FENOFIBRATE 160 MG: 160 TABLET ORAL at 08:06

## 2020-08-02 RX ADMIN — Medication 2 MG: at 17:55

## 2020-08-02 RX ADMIN — HYDROMORPHONE HYDROCHLORIDE 0.5 MG: 1 INJECTION, SOLUTION INTRAMUSCULAR; INTRAVENOUS; SUBCUTANEOUS at 22:05

## 2020-08-02 RX ADMIN — HYDROMORPHONE HYDROCHLORIDE 0.5 MG: 1 INJECTION, SOLUTION INTRAMUSCULAR; INTRAVENOUS; SUBCUTANEOUS at 08:15

## 2020-08-02 RX ADMIN — DEXTROSE AND SODIUM CHLORIDE: 5; 450 INJECTION, SOLUTION INTRAVENOUS at 20:11

## 2020-08-02 RX ADMIN — Medication 100 MG: at 08:07

## 2020-08-02 RX ADMIN — OMEGA-3-ACID ETHYL ESTERS 2 G: 1 CAPSULE, LIQUID FILLED ORAL at 20:04

## 2020-08-02 RX ADMIN — ACETAMINOPHEN 650 MG: 325 TABLET ORAL at 18:03

## 2020-08-02 RX ADMIN — DEXTROSE AND SODIUM CHLORIDE: 5; 450 INJECTION, SOLUTION INTRAVENOUS at 10:53

## 2020-08-02 RX ADMIN — ENOXAPARIN SODIUM 40 MG: 40 INJECTION SUBCUTANEOUS at 08:07

## 2020-08-02 RX ADMIN — PIPERACILLIN AND TAZOBACTAM 3.38 G: 3; .375 INJECTION, POWDER, LYOPHILIZED, FOR SOLUTION INTRAVENOUS at 17:55

## 2020-08-02 RX ADMIN — DEXTROSE AND SODIUM CHLORIDE: 5; 450 INJECTION, SOLUTION INTRAVENOUS at 01:46

## 2020-08-02 RX ADMIN — ONDANSETRON 4 MG: 2 INJECTION INTRAMUSCULAR; INTRAVENOUS at 02:31

## 2020-08-02 RX ADMIN — DEXTROSE AND SODIUM CHLORIDE: 5; 450 INJECTION, SOLUTION INTRAVENOUS at 13:52

## 2020-08-02 RX ADMIN — OMEGA-3-ACID ETHYL ESTERS 2 G: 1 CAPSULE, LIQUID FILLED ORAL at 08:11

## 2020-08-02 RX ADMIN — FOLIC ACID 1 MG: 1 TABLET ORAL at 08:07

## 2020-08-02 RX ADMIN — ATORVASTATIN CALCIUM 80 MG: 80 TABLET, FILM COATED ORAL at 20:04

## 2020-08-02 RX ADMIN — SODIUM CHLORIDE 11.2 UNITS/HR: 9 INJECTION, SOLUTION INTRAVENOUS at 00:29

## 2020-08-02 RX ADMIN — HYDROMORPHONE HYDROCHLORIDE 0.5 MG: 1 INJECTION, SOLUTION INTRAMUSCULAR; INTRAVENOUS; SUBCUTANEOUS at 01:53

## 2020-08-02 RX ADMIN — PIPERACILLIN AND TAZOBACTAM 3.38 G: 3; .375 INJECTION, POWDER, LYOPHILIZED, FOR SOLUTION INTRAVENOUS at 10:53

## 2020-08-02 RX ADMIN — PIPERACILLIN AND TAZOBACTAM 3.38 G: 3; .375 INJECTION, POWDER, LYOPHILIZED, FOR SOLUTION INTRAVENOUS at 02:33

## 2020-08-02 RX ADMIN — Medication 2 MG: at 05:50

## 2020-08-02 RX ADMIN — DEXTROSE AND SODIUM CHLORIDE: 5; 450 INJECTION, SOLUTION INTRAVENOUS at 15:59

## 2020-08-02 RX ADMIN — THERA TABS 1 TABLET: TAB at 08:07

## 2020-08-02 RX ADMIN — SODIUM CHLORIDE 7.9 UNITS/HR: 9 INJECTION, SOLUTION INTRAVENOUS at 20:13

## 2020-08-02 RX ADMIN — Medication 2 MG: at 13:52

## 2020-08-02 ASSESSMENT — PAIN SCALES - GENERAL
PAINLEVEL_OUTOF10: 4
PAINLEVEL_OUTOF10: 5
PAINLEVEL_OUTOF10: 7
PAINLEVEL_OUTOF10: 5
PAINLEVEL_OUTOF10: 4
PAINLEVEL_OUTOF10: 4
PAINLEVEL_OUTOF10: 7
PAINLEVEL_OUTOF10: 4

## 2020-08-02 ASSESSMENT — ENCOUNTER SYMPTOMS
ABDOMINAL PAIN: 1
EYES NEGATIVE: 1
NAUSEA: 1
DIARRHEA: 0
RESPIRATORY NEGATIVE: 1
CONSTIPATION: 0
VOMITING: 0

## 2020-08-02 NOTE — PROGRESS NOTES
Department of Internal Medicine  General Internal Medicine  Attending Progress Note      SUBJECTIVE:  Pt seen and examined. Pain much improved today.  No complaints    OBJECTIVE      Medications    Current Facility-Administered Medications: piperacillin-tazobactam (ZOSYN) 3.375 g in dextrose 5 % 50 mL IVPB extended infusion (mini-bag), 3.375 g, Intravenous, Q8H  neomycin-bacitracin-polymyxin (NEOSPORIN) ointment, , Topical, PRN  acetaminophen (TYLENOL) tablet 650 mg, 650 mg, Oral, Q4H PRN **OR** acetaminophen (TYLENOL) suppository 650 mg, 650 mg, Rectal, Q6H PRN  morphine (PF) injection 2 mg, 2 mg, Intravenous, Q3H PRN  HYDROmorphone (DILAUDID) injection 0.5 mg, 0.5 mg, Intravenous, Q4H PRN  dextrose 5 % and 0.45 % sodium chloride infusion, , Intravenous, Continuous  insulin regular (HUMULIN R;NOVOLIN R) 100 Units in sodium chloride 0.9 % 100 mL infusion, 0.1 Units/hr, Intravenous, Continuous  glucose (GLUTOSE) 40 % oral gel 15 g, 15 g, Oral, PRN  dextrose 50 % IV solution, 12.5 g, Intravenous, PRN  glucagon (rDNA) injection 1 mg, 1 mg, Intramuscular, PRN  dextrose 5 % solution, 100 mL/hr, Intravenous, PRN  atorvastatin (LIPITOR) tablet 80 mg, 80 mg, Oral, Nightly  fenofibrate (TRIGLIDE) tablet 160 mg, 160 mg, Oral, Daily  omega-3 acid ethyl esters (LOVAZA) capsule 2 g, 2 g, Oral, BID  vitamin B-1 (THIAMINE) tablet 100 mg, 100 mg, Oral, Daily  multivitamin 1 tablet, 1 tablet, Oral, Daily  folic acid (FOLVITE) tablet 1 mg, 1 mg, Oral, Daily  LORazepam (ATIVAN) injection 0.5 mg, 0.5 mg, Intravenous, Q6H PRN  sodium chloride flush 0.9 % injection 10 mL, 10 mL, Intravenous, 2 times per day  sodium chloride flush 0.9 % injection 10 mL, 10 mL, Intravenous, PRN  polyethylene glycol (GLYCOLAX) packet 17 g, 17 g, Oral, Daily PRN  promethazine (PHENERGAN) tablet 12.5 mg, 12.5 mg, Oral, Q6H PRN **OR** ondansetron (ZOFRAN) injection 4 mg, 4 mg, Intravenous, Q6H PRN  enoxaparin (LOVENOX) injection 40 mg, 40 mg, Subcutaneous, Daily  glucose (GLUTOSE) 40 % oral gel 15 g, 15 g, Oral, PRN  dextrose 50 % IV solution, 12.5 g, Intravenous, PRN  glucagon (rDNA) injection 1 mg, 1 mg, Intramuscular, PRN  dextrose 5 % solution, 100 mL/hr, Intravenous, PRN  ketorolac (TORADOL) injection 15 mg, 15 mg, Intravenous, Q6H PRN  Physical    VITALS:  BP (!) 107/50   Pulse 91   Temp 98.8 °F (37.1 °C) (Oral)   Resp 17   Ht 6' 2\" (1.88 m)   Wt 252 lb 3.3 oz (114.4 kg)   SpO2 95%   BMI 32.38 kg/m²   Constitutional: Lying in bed comfortably  Head: Normocephalic, atraumatic  Eyes: EOMI, PERRLA  ENT: moist mucous membranes  Neck: neck supple, trachea midline  Lungs: Good inspiratory effort, CTABL, no wheeze, no rhonchi, no rales  Heart: RRR, normal S1 and S2, no murmurs  GI: Soft, non-distended, minimal tenderness to palpation in epigastric region, no guarding, no rebound, +BS  MSK: Full ROM bilaterally, 5/5 strength bilaterally, no edema noted  Skin: warm, dry  Psych: appropriate affect     Data    CBC:   Lab Results   Component Value Date    WBC 12.1 08/02/2020    RBC 3.57 08/02/2020    HGB 10.8 08/02/2020    HCT 31.2 08/02/2020    MCV 87.4 08/02/2020    MCH 30.2 08/02/2020    MCHC 34.5 08/02/2020    RDW 14.6 08/02/2020     08/02/2020    MPV 9.0 05/29/2015     CMP:    Lab Results   Component Value Date     08/02/2020    K 3.2 08/02/2020    CL 99 08/02/2020    CO2 22 08/02/2020    BUN 3 08/02/2020    CREATININE 0.68 08/02/2020    GFRAA >60.0 08/02/2020    LABGLOM >60.0 08/02/2020    GLUCOSE 124 08/02/2020    PROT 6.5 08/02/2020    LABALBU 2.8 08/02/2020    CALCIUM 8.2 08/02/2020    BILITOT 0.4 08/02/2020    ALKPHOS 86 08/02/2020    AST <5 08/02/2020    ALT <5 08/02/2020     FLP:    Lab Results   Component Value Date    TRIG 1,566 08/02/2020    HDL 12 08/02/2020    LDLCALC see below 08/02/2020       ASSESSMENT AND PLAN      # Acute pancreatitis likely 2/2 hypertriglyceridemia  - CT with evidence of acute pancreatitis  - pt with hx of alcohol abuse, but has not had a drink in a week  - TG >4400 on admission. Today 1566  - initially started on NS. Will start insulin drip and D5-1/2NS to maintain -200 and monitor TG q12 hours. Will discontinue insulin when TG<500  - GI consulted  - pain, nausea control  - NPO except sips of water, ice chips. Will advance when pain improved    # Sepsis with unknown source  - febrile, leukocytosis  - blood cultures drawn and pending  - started on zosyn    # Hyponatremia  - will monitor in setting of very high TG's  - holding HCTZ    # Hypokalemia  - will replace as needed    # DM  - discontinued ISS. Starting insulin drip.  - holding metformin  - uncontrolled. A1C 11.8  - will consult endocrinology    DVT: Lovenox    Disposition: Pt with TG induced pancreatitis and sepsis on zosyn, Insulin drip and D5. GI and endocrinology consulted. Anticipated length of stay greater than 48 hours.       Shea Weeks, DO  Internal Medicine

## 2020-08-02 NOTE — PROGRESS NOTES
Assessment complete. Patient is alert and oriented. Patient denies SOB and dizziness at this time. Patient complains of pain 5/10. Morphine was given per order. Patient has presented with a fever this evening. His last temperature was 100.3. Tylenol was given per order. Patient is on an insulin drip with Q2 BS checks. The insulin drip was adjusted per order. Patient denies other needs at this time. Call light within reach. Will continue to monitor. 0230: Patient had one episode of emesis which was yellow in appearance. Zofran was given per order. Will continue to monitor.         Electronically signed by Akilah Briscoe RN on 8/2/2020 at 12:06 AM

## 2020-08-02 NOTE — PROGRESS NOTES
Pt c/o left elbow pain. Thinks it could possibly be gout since he's had it before. Perfect serve sent to [facility] [place] [email or web address] to make him aware. Pt also had temp of 101. Medicated with tylenol.     Electronically signed by Jose Browne RN on 8/2/2020 at 6:08 PM

## 2020-08-02 NOTE — PROGRESS NOTES
Nicki Willis is a 47 y.o. male patient.   Chief complaint uncontrolled type 2 diabetes hypertriglyceridemia  Current Facility-Administered Medications   Medication Dose Route Frequency Provider Last Rate Last Dose    piperacillin-tazobactam (ZOSYN) 3.375 g in dextrose 5 % 50 mL IVPB extended infusion (mini-bag)  3.375 g Intravenous Q8H Ruiz Cason, DO 12.5 mL/hr at 08/02/20 1755 3.375 g at 08/02/20 1755    neomycin-bacitracin-polymyxin (NEOSPORIN) ointment   Topical PRN Ruiz Cason, DO        acetaminophen (TYLENOL) tablet 650 mg  650 mg Oral Q4H PRN Ruiz Cason, DO   650 mg at 08/02/20 1803    Or    acetaminophen (TYLENOL) suppository 650 mg  650 mg Rectal Q6H PRN Ruiz Cason, DO        morphine (PF) injection 2 mg  2 mg Intravenous Q3H PRN Azam Smith MD   2 mg at 08/02/20 1755    HYDROmorphone (DILAUDID) injection 0.5 mg  0.5 mg Intravenous Q4H PRN Ruiz Cason, DO   0.5 mg at 08/02/20 0815    dextrose 5 % and 0.45 % sodium chloride infusion   Intravenous Continuous Ruiz Cason,  mL/hr at 08/02/20 1559      insulin regular (HUMULIN R;NOVOLIN R) 100 Units in sodium chloride 0.9 % 100 mL infusion  0.1 Units/hr Intravenous Continuous Ruiz Cason, DO 14.46 mL/hr at 08/02/20 1754 14.46 Units/hr at 08/02/20 1754    glucose (GLUTOSE) 40 % oral gel 15 g  15 g Oral PRN Ruiz Cason, DO        dextrose 50 % IV solution  12.5 g Intravenous PRN Ruiz Cason, DO        glucagon (rDNA) injection 1 mg  1 mg Intramuscular PRN Ruiz Cason, DO        dextrose 5 % solution  100 mL/hr Intravenous PRN Ruiz Cason, DO        atorvastatin (LIPITOR) tablet 80 mg  80 mg Oral Nightly Stanley Del Castillo MD   80 mg at 08/01/20 1947    fenofibrate (TRIGLIDE) tablet 160 mg  160 mg Oral Daily Stanley Del Castillo MD   160 mg at 08/02/20 0806    omega-3 acid ethyl esters (LOVAZA) capsule 2 g  2 g Oral BID Stanley Del Castillo MD   2 g at 08/02/20 0811    vitamin B-1 (THIAMINE) tablet 100 mg  100 mg Oral Daily Jhonnie Organ Yeboah, DO   100 mg at 08/02/20 5305    multivitamin 1 tablet  1 tablet Oral Daily [facility] DO   1 tablet at 24/56/34 3848    folic acid (FOLVITE) tablet 1 mg  1 mg Oral Daily [facility] DO   1 mg at 08/02/20 4138    LORazepam (ATIVAN) injection 0.5 mg  0.5 mg Intravenous Q6H PRN [facility] DO   0.5 mg at 07/31/20 1619    sodium chloride flush 0.9 % injection 10 mL  10 mL Intravenous 2 times per day Virginie Yu MD   10 mL at 08/01/20 1947    sodium chloride flush 0.9 % injection 10 mL  10 mL Intravenous PRN Virginie Yu MD        polyethylene glycol (GLYCOLAX) packet 17 g  17 g Oral Daily PRN Virginie Yu MD        promethazine (PHENERGAN) tablet 12.5 mg  12.5 mg Oral Q6H PRN Virginie Yu MD        Or    ondansetron [facility]) injection 4 mg  4 mg Intravenous Q6H PRN Virginie Yu MD   4 mg at 08/02/20 0231    enoxaparin (LOVENOX) injection 40 mg  40 mg Subcutaneous Daily Virginie Yu MD   40 mg at 08/02/20 0807    glucose (GLUTOSE) 40 % oral gel 15 g  15 g Oral PRN Virginie Yu MD        dextrose 50 % IV solution  12.5 g Intravenous PRN Virginie Yu MD        glucagon (rDNA) injection 1 mg  1 mg Intramuscular PRN Virginie Yu MD        dextrose 5 % solution  100 mL/hr Intravenous PRN Virginie Yu MD        ketorolac (TORADOL) injection 15 mg  15 mg Intravenous Q6H PRN Virginie Yu MD   15 mg at 07/31/20 1619     No Known Allergies  Active Problems:    Acute pancreatitis    Hypertriglyceridemia    Uncontrolled type 2 diabetes mellitus with hyperglycemia ([facility] [place] [address]  Resolved Problems:    * No resolved hospital problems. *    Blood pressure (!) 107/50, pulse 91, temperature 98.8 °F (37.1 °C), temperature source Oral, resp. rate 17, height 6' 2\" (1.88 m), weight 252 lb 3.3 oz (114.4 kg), SpO2 95 %. Subjective:  Symptoms:  Stable. Diet:  NPO. Activity level: Impaired due to pain. Pain:  He complains of pain that is mild.       Objective:  General Appearance: Comfortable. Vital signs: (most recent): Blood pressure (!) 107/50, pulse 91, temperature 98.8 °F (37.1 °C), temperature source Oral, resp. rate 17, height 6' 2\" (1.88 m), weight 252 lb 3.3 oz (114.4 kg), SpO2 95 %. Vital signs are normal.    HEENT: Normal HEENT exam.    Lungs:  Normal effort. Heart: Normal rate. Abdomen: Abdomen is soft. Extremities: Normal range of motion. Neurological: Patient is alert. Skin:  No rash. Results for Cindy Arana (MRN [id number]) as of 8/2/2020 18:17   Ref. Range 8/2/2020 11:31 8/2/2020 13:51 8/2/2020 15:18 8/2/2020 15:58 8/2/2020 17:49   POC Glucose Latest Ref Range: 60 - 115 mg/dl  172 (H)  123 (H) 301 (H)   Triglycerides Latest Ref Range: 0 - 150 mg/dL 1,503 (H)           Assessment:    Condition: In stable condition. Improving. (Uncontrolled type 2 diabetes improving on insulin drip  Hypercholesterolemia hypertriglyceridemia last triglyceride 1500 down from more than 4500 before  Pancreatitis secondary to hypertriglyceridemia  Improved abdominal pain). Plan:   (Continue patient on IV insulin drip IV fluids  Advance diet  Continue Lipitor fenofibrate Lovaza  Patient to stay in the hospital until the triglycerides below 500  We will go home on for insulin injections a day  Discussed about outpatient management including good glycemic control  Avoiding fried fatty foods increase activity and to lose weight  Verbalized understanding).        Cheri Llanos MD  8/2/2020

## 2020-08-02 NOTE — CONSULTS
Infectious Disease     Patient Name: Razia Montiel  Date: 8/2/2020  YOB: 1980  Medical Record Number: [id number]      Pancreatitis        History of Present Illness:      Past medical history diabetes hypertension hyperlipidemia    Patient presents with epigastric vomiting pain progressively worsening nausea no diarrhea   Fever 102.5 with temperature remaining around 100.8 for most of the second hospital day    Lipase slightly elevated white count elevated CT scan showing pancreatitis    Patient was placed on Zosyn    Procedure  Component  Value  Units  Date/Time    Culture, Blood 2 [[id number]]   Collected: 07/31/20 1651    Order Status: Completed  Specimen: Peripheral, Left from Blood  Updated: 08/01/20 2215     Culture, Blood 2  No Growth to date.  Any change in status will be called. Narrative:      ORDER#: [id number]                          ORDERED BY: Shani Schmitt   SOURCE: Blood Left Peripheral              COLLECTED:  07/31/20 16:51   ANTIBIOTICS AT [facility]. :                      RECEIVED :  07/31/20 17:10    Culture, Blood 1 [[id number]]   Collected: 07/31/20 1651    Order Status: Completed  Specimen: Blood  Updated: 08/01/20 2215     Blood Culture, Routine  No Growth to date.  Any change in status will be called. Narrative:      ORDER#: [id number]                          ORDERED BY: Beauty Promise   SOURCE: Blood                              COLLECTED:  07/31/20 16:51   ANTIBIOTICS AT [facility]. :                      RECEIVED :  07/31/20 17:10    Culture, Blood 1 [[id number]]   Collected: 07/30/20 8054    Order Status: Completed  Specimen: Blood  Updated: 08/01/20 0615     Blood Culture, Routine  No Growth to date.  Any change in status will be called. Narrative:      ORDER#: [id number]                          ORDERED BY: Shani Schmitt   SOURCE: Blood                              COLLECTED:  07/30/20 23:54   ANTIBIOTICS AT [facility]. :                      RECEIVED :  07/31/20 00:13    Culture, Blood 2 [[id number]]   Collected: 07/31/20 1645    Order Status: Canceled  Specimen: Peripheral, Left from Blood         CT ABDOMEN PELVIS W IV CONTRAST: 7/30/2020         CLINICAL HISTORY:  epigastric pain .         COMPARISON: 3/27/2007.         TECHNIQUE: Spiral images were obtained of the abdomen and pelvis after the uneventful intravenous administration of approximately 100 mL of Isovue-370 contrast.         All CT scans at this facility use dose modulation, iterative reconstruction, and/or weight based dosing when appropriate to reduce radiation dose to as low as reasonably achievable.              FINDINGS:         Mild ill-defined inflammation is noted surrounding the pancreas, suspicious for acute pancreatitis.         There is no fluid collection, abnormal bowel dilatation, or other complication identified. The pancreas is otherwise unremarkable.         The liver is moderately enlarged with moderate fatty infiltration. Mild colonic diverticulosis is again noted, without evidence of diverticulitis.         The gallbladder, spleen, adrenal glands, kidneys, great vessels, small bowel loops, appendix, urinary bladder, and additional images of pelvis appear within normal limits.         The visualized lung bases and musculoskeletal structures are unremarkable.                        Impression         FINDINGS CONSISTENT WITH MILD UNCOMPLICATED ACUTE PANCREATITIS.         MODERATE HEPATOMEGALY AND STEATOSIS       Lipase [[id number]] (Abnormal)  Collected: 07/30/20 1945        Specimen: Blood  Updated: 07/30/20 2103        Lipase  123  U/L             Review of Systems   Constitutional: Positive for fever. Negative for chills, diaphoresis and fatigue. HENT: Negative. Eyes: Negative. Respiratory: Negative. Cardiovascular: Negative. Gastrointestinal: Positive for abdominal pain and nausea. Negative for constipation, diarrhea and vomiting. Endocrine: Negative. Musculoskeletal: Negative.     Skin: Negative. Neurological: Negative. Review of Systems: All 14 review of systems negative other than as stated above    Social History     Tobacco Use    Smoking status: Current Every Day Smoker     Packs/day: 0.50     Last attempt to quit: 2014     Years since quittin.9    Smokeless tobacco: Never Used   Substance Use Topics    Alcohol use: Yes     Comment: weekends    Drug use: No         Past Medical History:   Diagnosis Date    Depression     Diabetes mellitus ([facility] [place] [address]     Hyperlipidemia     Hypertension            Past Surgical History:   Procedure Laterality Date    COLONOSCOPY  6/12/15    w/polypectomy     UPPER GASTROINTESTINAL ENDOSCOPY  6/12/15    w/bx,dilation          No current facility-administered medications on file prior to encounter. Current Outpatient Medications on File Prior to Encounter   Medication Sig Dispense Refill    Icosapent Ethyl (VASCEPA) 1 g CAPS capsule Take 2 capsules by mouth daily      metFORMIN (GLUCOPHAGE) 500 MG tablet Take 500 mg by mouth 2 times daily (with meals)       lisinopril-hydrochlorothiazide (PRINZIDE;ZESTORETIC) 20-12.5 MG per tablet Take 1 tablet by mouth daily 90 tablet 1       No Known Allergies      Family History   Problem Relation Age of Onset    High Blood Pressure Father          Physical Exam:      Physical Exam   Constitutional: He is oriented to person, place, and time. HENT:   Head: Normocephalic. Eyes: Pupils are equal, round, and reactive to light. Neck: Normal range of motion. No tracheal deviation present. No thyromegaly present. Cardiovascular: Normal heart sounds. No murmur heard. Pulmonary/Chest: Effort normal and breath sounds normal. No respiratory distress. He has no wheezes. He has no rales. He exhibits no tenderness. Abdominal: Soft. Bowel sounds are normal. He exhibits no distension and no mass. There is no abdominal tenderness. There is no rebound and no guarding. Musculoskeletal:         General: No tenderness or edema. Lymphadenopathy:     He has no cervical adenopathy. He has no axillary adenopathy. Right: No inguinal, no supraclavicular and no epitrochlear adenopathy present. Left: No inguinal, no supraclavicular and no epitrochlear adenopathy present. Neurological: He is alert and oriented to person, place, and time. Skin: Skin is warm. No rash noted. No erythema. No pallor. Blood pressure (!) 107/50, pulse 91, temperature 98.8 °F (37.1 °C), temperature source Oral, resp. rate 17, height 6' 2\" (1.88 m), weight 252 lb 3.3 oz (114.4 kg), SpO2 95 %.       .   Lab Results   Component Value Date    WBC 12.1 (H) 08/02/2020    HGB 10.8 (L) 08/02/2020    HCT 31.2 (L) 08/02/2020    MCV 87.4 08/02/2020     08/02/2020     Lab Results   Component Value Date     08/02/2020    K 3.2 08/02/2020    CL 99 08/02/2020    CO2 22 08/02/2020    BUN 3 08/02/2020    CREATININE 0.68 08/02/2020    GLUCOSE 124 08/02/2020    CALCIUM 8.2 08/02/2020                ASSESSMENT:  Patient Active Problem List   Diagnosis    HTN (hypertension), benign    Acute pancreatitis    Hypertriglyceridemia    Uncontrolled type 2 diabetes mellitus with hyperglycemia (HCC)         PLAN:      Pancreatitis    Most likely reason patient's fever and white count are elevated and seems to be improving cultures from admission remain no growth      Obtain urine for urinalysis and culture    Continue Zosyn for another 24 hours of blood cultures remain negative and urinalysis is normal discontinue Zosyn

## 2020-08-03 LAB
ALBUMIN SERPL-MCNC: 2.5 G/DL (ref 3.5–4.6)
ALP BLD-CCNC: 91 U/L (ref 35–104)
ALT SERPL-CCNC: 41 U/L (ref 0–41)
ANION GAP SERPL CALCULATED.3IONS-SCNC: 12 MEQ/L (ref 9–15)
AST SERPL-CCNC: 36 U/L (ref 0–40)
BASOPHILS ABSOLUTE: 0 K/UL (ref 0–0.2)
BASOPHILS RELATIVE PERCENT: 0.2 %
BILIRUB SERPL-MCNC: 0.3 MG/DL (ref 0.2–0.7)
BUN BLDV-MCNC: 3 MG/DL (ref 6–20)
CALCIUM SERPL-MCNC: 8.3 MG/DL (ref 8.5–9.9)
CHLORIDE BLD-SCNC: 103 MEQ/L (ref 95–107)
CHOLESTEROL, TOTAL: 356 MG/DL (ref 0–199)
CO2: 22 MEQ/L (ref 20–31)
CREAT SERPL-MCNC: 0.67 MG/DL (ref 0.7–1.2)
EOSINOPHILS ABSOLUTE: 0.1 K/UL (ref 0–0.7)
EOSINOPHILS RELATIVE PERCENT: 0.9 %
GFR AFRICAN AMERICAN: >60
GFR NON-AFRICAN AMERICAN: >60
GLOBULIN: 3.9 G/DL (ref 2.3–3.5)
GLUCOSE BLD-MCNC: 135 MG/DL (ref 60–115)
GLUCOSE BLD-MCNC: 142 MG/DL (ref 60–115)
GLUCOSE BLD-MCNC: 159 MG/DL (ref 60–115)
GLUCOSE BLD-MCNC: 167 MG/DL (ref 60–115)
GLUCOSE BLD-MCNC: 168 MG/DL (ref 60–115)
GLUCOSE BLD-MCNC: 178 MG/DL (ref 60–115)
GLUCOSE BLD-MCNC: 181 MG/DL (ref 60–115)
GLUCOSE BLD-MCNC: 181 MG/DL (ref 70–99)
GLUCOSE BLD-MCNC: 187 MG/DL (ref 60–115)
GLUCOSE BLD-MCNC: 240 MG/DL (ref 60–115)
GLUCOSE BLD-MCNC: 241 MG/DL (ref 60–115)
HCT VFR BLD CALC: 30.9 % (ref 42–52)
HDLC SERPL-MCNC: 14 MG/DL (ref 40–59)
HEMOGLOBIN: 10.4 G/DL (ref 14–18)
LDL CHOLESTEROL CALCULATED: ABNORMAL MG/DL (ref 0–129)
LYMPHOCYTES ABSOLUTE: 1.1 K/UL (ref 1–4.8)
LYMPHOCYTES RELATIVE PERCENT: 13.4 %
MAGNESIUM: 1.9 MG/DL (ref 1.7–2.4)
MCH RBC QN AUTO: 29.7 PG (ref 27–31.3)
MCHC RBC AUTO-ENTMCNC: 33.8 % (ref 33–37)
MCV RBC AUTO: 87.9 FL (ref 80–100)
MONOCYTES ABSOLUTE: 0.6 K/UL (ref 0.2–0.8)
MONOCYTES RELATIVE PERCENT: 7.1 %
NEUTROPHILS ABSOLUTE: 6.6 K/UL (ref 1.4–6.5)
NEUTROPHILS RELATIVE PERCENT: 78.4 %
PDW BLD-RTO: 15.2 % (ref 11.5–14.5)
PERFORMED ON: ABNORMAL
PLATELET # BLD: 263 K/UL (ref 130–400)
POTASSIUM SERPL-SCNC: 3.1 MEQ/L (ref 3.4–4.9)
RBC # BLD: 3.51 M/UL (ref 4.7–6.1)
SODIUM BLD-SCNC: 137 MEQ/L (ref 135–144)
TOTAL PROTEIN: 6.4 G/DL (ref 6.3–8)
TRIGL SERPL-MCNC: 753 MG/DL (ref 0–150)
TRIGL SERPL-MCNC: 847 MG/DL (ref 0–150)
WBC # BLD: 8.5 K/UL (ref 4.8–10.8)

## 2020-08-03 PROCEDURE — 2580000003 HC RX 258: Performed by: INTERNAL MEDICINE

## 2020-08-03 PROCEDURE — 6370000000 HC RX 637 (ALT 250 FOR IP): Performed by: INTERNAL MEDICINE

## 2020-08-03 PROCEDURE — 99232 SBSQ HOSP IP/OBS MODERATE 35: CPT | Performed by: INTERNAL MEDICINE

## 2020-08-03 PROCEDURE — 85025 COMPLETE CBC W/AUTO DIFF WBC: CPT

## 2020-08-03 PROCEDURE — 1210000000 HC MED SURG R&B

## 2020-08-03 PROCEDURE — 80053 COMPREHEN METABOLIC PANEL: CPT

## 2020-08-03 PROCEDURE — 83735 ASSAY OF MAGNESIUM: CPT

## 2020-08-03 PROCEDURE — 80061 LIPID PANEL: CPT

## 2020-08-03 PROCEDURE — 6360000002 HC RX W HCPCS: Performed by: INTERNAL MEDICINE

## 2020-08-03 PROCEDURE — 36415 COLL VENOUS BLD VENIPUNCTURE: CPT

## 2020-08-03 RX ORDER — POTASSIUM CHLORIDE 20 MEQ/1
40 TABLET, EXTENDED RELEASE ORAL ONCE
Status: COMPLETED | OUTPATIENT
Start: 2020-08-03 | End: 2020-08-03

## 2020-08-03 RX ORDER — INSULIN GLARGINE 100 [IU]/ML
60 INJECTION, SOLUTION SUBCUTANEOUS NIGHTLY
Status: DISCONTINUED | OUTPATIENT
Start: 2020-08-03 | End: 2020-08-04 | Stop reason: HOSPADM

## 2020-08-03 RX ADMIN — POTASSIUM CHLORIDE 40 MEQ: 20 TABLET, EXTENDED RELEASE ORAL at 14:35

## 2020-08-03 RX ADMIN — Medication 2 MG: at 04:07

## 2020-08-03 RX ADMIN — DEXTROSE AND SODIUM CHLORIDE: 5; 450 INJECTION, SOLUTION INTRAVENOUS at 01:17

## 2020-08-03 RX ADMIN — ENOXAPARIN SODIUM 40 MG: 40 INJECTION SUBCUTANEOUS at 08:03

## 2020-08-03 RX ADMIN — Medication 10 ML: at 08:03

## 2020-08-03 RX ADMIN — KETOROLAC TROMETHAMINE 15 MG: 15 INJECTION, SOLUTION INTRAMUSCULAR; INTRAVENOUS at 18:05

## 2020-08-03 RX ADMIN — Medication 10 ML: at 20:40

## 2020-08-03 RX ADMIN — THERA TABS 1 TABLET: TAB at 08:03

## 2020-08-03 RX ADMIN — FOLIC ACID 1 MG: 1 TABLET ORAL at 08:03

## 2020-08-03 RX ADMIN — ATORVASTATIN CALCIUM 80 MG: 80 TABLET, FILM COATED ORAL at 20:37

## 2020-08-03 RX ADMIN — PIPERACILLIN AND TAZOBACTAM 3.38 G: 3; .375 INJECTION, POWDER, LYOPHILIZED, FOR SOLUTION INTRAVENOUS at 20:37

## 2020-08-03 RX ADMIN — DEXTROSE AND SODIUM CHLORIDE: 5; 450 INJECTION, SOLUTION INTRAVENOUS at 05:13

## 2020-08-03 RX ADMIN — PIPERACILLIN AND TAZOBACTAM 3.38 G: 3; .375 INJECTION, POWDER, LYOPHILIZED, FOR SOLUTION INTRAVENOUS at 01:55

## 2020-08-03 RX ADMIN — OMEGA-3-ACID ETHYL ESTERS 2 G: 1 CAPSULE, LIQUID FILLED ORAL at 08:03

## 2020-08-03 RX ADMIN — Medication 100 MG: at 08:02

## 2020-08-03 RX ADMIN — OMEGA-3-ACID ETHYL ESTERS 2 G: 1 CAPSULE, LIQUID FILLED ORAL at 20:37

## 2020-08-03 RX ADMIN — KETOROLAC TROMETHAMINE 15 MG: 15 INJECTION, SOLUTION INTRAMUSCULAR; INTRAVENOUS at 11:33

## 2020-08-03 RX ADMIN — INSULIN GLARGINE 60 UNITS: 100 INJECTION, SOLUTION SUBCUTANEOUS at 20:50

## 2020-08-03 RX ADMIN — FENOFIBRATE 160 MG: 160 TABLET ORAL at 08:02

## 2020-08-03 RX ADMIN — PIPERACILLIN AND TAZOBACTAM 3.38 G: 3; .375 INJECTION, POWDER, LYOPHILIZED, FOR SOLUTION INTRAVENOUS at 11:32

## 2020-08-03 RX ADMIN — DEXTROSE AND SODIUM CHLORIDE: 5; 450 INJECTION, SOLUTION INTRAVENOUS at 11:32

## 2020-08-03 ASSESSMENT — ENCOUNTER SYMPTOMS
CONSTIPATION: 0
VOMITING: 0
NAUSEA: 1
EYES NEGATIVE: 1
DIARRHEA: 0
NAUSEA: 0
ABDOMINAL PAIN: 1
RESPIRATORY NEGATIVE: 1

## 2020-08-03 ASSESSMENT — PAIN SCALES - GENERAL
PAINLEVEL_OUTOF10: 0
PAINLEVEL_OUTOF10: 7
PAINLEVEL_OUTOF10: 0
PAINLEVEL_OUTOF10: 7
PAINLEVEL_OUTOF10: 6

## 2020-08-03 NOTE — PROGRESS NOTES
Assessment complete. Patient alert and oriented. Patient denies SOB and dizziness at this time. Pain was rated 4/10 in abdomen by patient. Dilaudid was given per order. Patient stated he \"feels the best he has in a long time. \"  Patient remains on insulin drip with Q2 BS. Patient denies other needs at this time. Call light within reach. Will continue to monitor.

## 2020-08-03 NOTE — PROGRESS NOTES
Assessment complete this AM. Pt medicated with toradol for gout type pain in left elbow. Triglyceride levels trending down, 753 this morning at 1053. Insulin drip and continous fluids discontinued per orders. Pt independent in room, showered today. NSR on tele. Now on AC/HS accuchecks.  Electronically signed by Jazz Falcon RN on 8/3/2020 at 5:04 PM

## 2020-08-03 NOTE — PROGRESS NOTES
Infectious Disease     Patient Name: Clark Hutson  Date: 8/3/2020  YOB: 1980  Medical Record Number: [id number]      Pancreatitis      Patient presents with epigastric vomiting pain progressively worsening nausea no diarrhea   Fever 102.5 with temperature remaining around 100.8 for most of the second hospital day    Lipase slightly elevated white count elevated CT scan showing pancreatitis    Patient was placed on Zosyn      CT ABDOMEN PELVIS W IV CONTRAST: 7/30/2020         CLINICAL HISTORY:  epigastric pain .         COMPARISON: 3/27/2007.         TECHNIQUE: Spiral images were obtained of the abdomen and pelvis after the uneventful intravenous administration of approximately 100 mL of Isovue-370 contrast.         All CT scans at this facility use dose modulation, iterative reconstruction, and/or weight based dosing when appropriate to reduce radiation dose to as low as reasonably achievable.              FINDINGS:         Mild ill-defined inflammation is noted surrounding the pancreas, suspicious for acute pancreatitis.         There is no fluid collection, abnormal bowel dilatation, or other complication identified. The pancreas is otherwise unremarkable.         The liver is moderately enlarged with moderate fatty infiltration.  Mild colonic diverticulosis is again noted, without evidence of diverticulitis.         The gallbladder, spleen, adrenal glands, kidneys, great vessels, small bowel loops, appendix, urinary bladder, and additional images of pelvis appear within normal limits.         The visualized lung bases and musculoskeletal structures are unremarkable.                        Impression         FINDINGS CONSISTENT WITH MILD UNCOMPLICATED ACUTE PANCREATITIS.         MODERATE HEPATOMEGALY AND STEATOSIS       Lipase [[id number]] (Abnormal)  Collected: 07/30/20 1945        Specimen: Blood  Updated: 07/30/20 2103        Lipase  123  U/L             Review of Systems   Constitutional: Positive for fever. Negative for chills, diaphoresis and fatigue. Eyes: Negative. Respiratory: Negative. Cardiovascular: Negative. Gastrointestinal: Positive for abdominal pain and nausea. Negative for constipation, diarrhea and vomiting. Skin: Negative. Physical Exam   Cardiovascular: Normal heart sounds. No murmur heard. Pulmonary/Chest: Effort normal and breath sounds normal. No respiratory distress. He has no wheezes. He has no rales. Abdominal: Soft. Bowel sounds are normal. He exhibits no distension. There is no abdominal tenderness. There is no rebound and no guarding. Skin: Skin is warm. No rash noted. Blood pressure 135/81, pulse 80, temperature 99.3 °F (37.4 °C), temperature source Oral, resp. rate 16, height 6' 2\" (1.88 m), weight 252 lb 3.3 oz (114.4 kg), SpO2 94 %. .   Lab Results   Component Value Date    WBC 8.5 08/03/2020    HGB 10.4 (L) 08/03/2020    HCT 30.9 (L) 08/03/2020    MCV 87.9 08/03/2020     08/03/2020     Lab Results   Component Value Date     08/03/2020    K 3.1 08/03/2020     08/03/2020    CO2 22 08/03/2020    BUN 3 08/03/2020    CREATININE 0.67 08/03/2020    GLUCOSE 181 08/03/2020    CALCIUM 8.3 08/03/2020          Culture, Urine [[id number]]   Collected: 08/02/20 1518    Order Status: Sent  Specimen: Urine, clean catch  Updated: 08/02/20 1518    Culture, Blood 2 [[id number]]   Collected: 07/31/20 1651    Order Status: Completed  Specimen: Peripheral, Left from Blood  Updated: 08/01/20 2215     Culture, Blood 2  No Growth to date.  Any change in status will be called. Narrative:      ORDER#: [id number]                          ORDERED BY: Meri Sandra   SOURCE: Blood Left Peripheral              COLLECTED:  07/31/20 16:51   ANTIBIOTICS AT [facility]. :                      RECEIVED :  07/31/20 17:10    Culture, Blood 1 [[id number]]   Collected: 07/31/20 1651    Order Status: Completed  Specimen: Blood  Updated: 08/01/20 2215     Blood Culture, Routine  No Growth to date.  Any change in status will be called. Narrative:      ORDER#: [id number]                          ORDERED BY: Thelma Raymundo   SOURCE: Blood                              COLLECTED:  07/31/20 16:51   ANTIBIOTICS AT [facility]. :                      RECEIVED :  07/31/20 17:10    Culture, Blood 1 [[id number]]   Collected: 07/30/20 2354    Order Status: Completed  Specimen: Blood  Updated: 08/01/20 0615     Blood Culture, Routine  No Growth to date.  Any change in status will be called. Narrative:      ORDER#: [id number]                          ORDERED BY: Tamiko Nobles   SOURCE: Blood                              COLLECTED:  07/30/20 23:54   ANTIBIOTICS AT [facility]. :                      RECEIVED :  07/31/20 00:13    Culture, Blood 2 [[id number]]   Collected: 07/31/20 1645    Order Status: Canceled  Specimen: Peripheral, Left from Blood             ASSESSMENT:  Patient Active Problem List   Diagnosis    HTN (hypertension), benign    Acute pancreatitis    Hypertriglyceridemia    Uncontrolled type 2 diabetes mellitus with hyperglycemia (HCC)         PLAN:      Pancreatitis    Most likely reason patient's fever and white count are elevated and seems to be improving cultures from admission remain no growth    Cultures are negative

## 2020-08-03 NOTE — PROGRESS NOTES
Hospitalist Progress Note      PCP: Marylin Child DO    Date of Admission: 7/30/2020    Chief Complaint:  No acute events, having fevers with Tmax-38.4C in the last 24 hours, complaining of right knee and left elbow pain,    Medications:  Reviewed    Infusion Medications    dextrose 5 % and 0.45 % NaCl 200 mL/hr at 08/03/20 0513    insulin 9 Units/hr (08/03/20 1000)    dextrose      dextrose       Scheduled Medications    potassium chloride  40 mEq Oral Once    piperacillin-tazobactam  3.375 g Intravenous Q8H    atorvastatin  80 mg Oral Nightly    fenofibrate  160 mg Oral Daily    omega-3 acid ethyl esters  2 g Oral BID    thiamine  100 mg Oral Daily    multivitamin  1 tablet Oral Daily    folic acid  1 mg Oral Daily    sodium chloride flush  10 mL Intravenous 2 times per day    enoxaparin  40 mg Subcutaneous Daily     PRN Meds: neomycin-bacitracin-polymyxin, acetaminophen **OR** acetaminophen, morphine, HYDROmorphone, glucose, dextrose, glucagon (rDNA), dextrose, LORazepam, sodium chloride flush, polyethylene glycol, promethazine **OR** ondansetron, glucose, dextrose, glucagon (rDNA), dextrose, ketorolac      Intake/Output Summary (Last 24 hours) at 8/3/2020 1131  Last data filed at 8/3/2020 0520  Gross per 24 hour   Intake 4891 ml   Output 600 ml   Net 4291 ml       Exam:    /81   Pulse 80   Temp 99.3 °F (37.4 °C) (Oral)   Resp 16   Ht 6' 2\" (1.88 m)   Wt 252 lb 3.3 oz (114.4 kg)   SpO2 94%   BMI 32.38 kg/m²     General appearance: appears stated age and cooperative. Respiratory:  clear to auscultation, bilaterally   Cardiovascular: Regular rate and rhythm with normal S1/S2   Abdomen: Soft, active bowel sounds. Musculoskeletal: No edema bilaterally.         Labs:   Recent Labs     08/01/20  0631 08/02/20  0627 08/03/20  0619   WBC 16.0* 12.1* 8.5   HGB 12.4* 10.8* 10.4*   HCT 34.3* 31.2* 30.9*    283 263     Recent Labs     08/01/20  0631 08/02/20  0627 08/03/20  0619   NA 128* 134* 137   K 4.2 3.2* 3.1*   CL 96 99 103   CO2 18* 22 22   BUN 3* 3* 3*   CREATININE 0.49* 0.68* 0.67*   CALCIUM 8.3* 8.2* 8.3*     Recent Labs     08/01/20  0631 08/02/20  0627 08/03/20  0619   AST <5 <5 36   ALT <5 <5 41   BILITOT 0.5 0.4 0.3   ALKPHOS 75 86 91     No results for input(s): INR in the last 72 hours. No results for input(s): Andrew Berta in the last 72 hours. Urinalysis:      Lab Results   Component Value Date    NITRU Negative 08/02/2020    WBCUA 0-2 08/02/2020    BACTERIA Negative 08/02/2020    RBCUA 0-2 08/02/2020    BLOODU Negative 08/02/2020    SPECGRAV 1.014 08/02/2020    GLUCOSEU 100 08/02/2020       Radiology:  RADIOLOGY REPORT   Final Result      US ABDOMEN LIMITED   Final Result      NO EVIDENCE OF CHOLELITHIASIS OR SIGNIFICANT BILIARY DILATATION. MODERATE HEPATOMEGALY AND STEATOSIS. CT ABDOMEN PELVIS W IV CONTRAST Additional Contrast? None   Final Result      FINDINGS CONSISTENT WITH MILD UNCOMPLICATED ACUTE PANCREATITIS. MODERATE HEPATOMEGALY AND STEATOSIS. Assessment/Plan:    Acute pancreatitis   - likely due to severe hypertriglyceridemia  - TG >4400 on admission, trending down with insulin infusion  - monitor TG q12 hours, will discontinue insulin when TG<500  - on fenofibrate and Lovaza  - GI and endocrinology following     Possible sepsis with fevers and leukocytosis  - still febrile, complaining of right knee and left elbow pain, has history of gout  - leukocytosis trending down  - blood cultures no growth  - on IV Zosyn  - ID following     Pseudohyponatremia  - due to very high TG's  - improved with Tg trending down  - holding HCTZ     Hypokalemia  - replaced    DM2 with hyperglycemia  - on insulin drip due to hypertriglyceridemia .   - HbA1C was 11.8  - management per endocrinology    Hypercholesterolemia   - on high dose Lipitor          Electronically signed by Homa Rm MD on 8/3/2020 at 11:31 AM

## 2020-08-04 VITALS
RESPIRATION RATE: 14 BRPM | WEIGHT: 252.21 LBS | BODY MASS INDEX: 32.37 KG/M2 | SYSTOLIC BLOOD PRESSURE: 120 MMHG | DIASTOLIC BLOOD PRESSURE: 72 MMHG | HEART RATE: 77 BPM | OXYGEN SATURATION: 96 % | TEMPERATURE: 98.8 F | HEIGHT: 74 IN

## 2020-08-04 LAB
ALBUMIN SERPL-MCNC: 3 G/DL (ref 3.5–4.6)
ALP BLD-CCNC: 118 U/L (ref 35–104)
ALT SERPL-CCNC: 55 U/L (ref 0–41)
ANION GAP SERPL CALCULATED.3IONS-SCNC: 14 MEQ/L (ref 9–15)
AST SERPL-CCNC: 44 U/L (ref 0–40)
BASOPHILS ABSOLUTE: 0.1 K/UL (ref 0–0.2)
BASOPHILS RELATIVE PERCENT: 1.1 %
BILIRUB SERPL-MCNC: 0.3 MG/DL (ref 0.2–0.7)
BUN BLDV-MCNC: 6 MG/DL (ref 6–20)
C-REACTIVE PROTEIN, HIGH SENSITIVITY: 214.6 MG/L (ref 0–5)
CALCIUM SERPL-MCNC: 8.9 MG/DL (ref 8.5–9.9)
CHLORIDE BLD-SCNC: 103 MEQ/L (ref 95–107)
CHOLESTEROL, TOTAL: 314 MG/DL (ref 0–199)
CO2: 21 MEQ/L (ref 20–31)
CREAT SERPL-MCNC: 0.67 MG/DL (ref 0.7–1.2)
EOSINOPHILS ABSOLUTE: 0.1 K/UL (ref 0–0.7)
EOSINOPHILS RELATIVE PERCENT: 1.6 %
GFR AFRICAN AMERICAN: >60
GFR NON-AFRICAN AMERICAN: >60
GLOBULIN: 4 G/DL (ref 2.3–3.5)
GLUCOSE BLD-MCNC: 157 MG/DL (ref 60–115)
GLUCOSE BLD-MCNC: 183 MG/DL (ref 70–99)
GLUCOSE BLD-MCNC: 186 MG/DL (ref 60–115)
HCT VFR BLD CALC: 31.3 % (ref 42–52)
HDLC SERPL-MCNC: 16 MG/DL (ref 40–59)
HEMOGLOBIN: 10.4 G/DL (ref 14–18)
LDL CHOLESTEROL CALCULATED: ABNORMAL MG/DL (ref 0–129)
LYMPHOCYTES ABSOLUTE: 1.6 K/UL (ref 1–4.8)
LYMPHOCYTES RELATIVE PERCENT: 20 %
MAGNESIUM: 2 MG/DL (ref 1.7–2.4)
MCH RBC QN AUTO: 29 PG (ref 27–31.3)
MCHC RBC AUTO-ENTMCNC: 33.3 % (ref 33–37)
MCV RBC AUTO: 87.3 FL (ref 80–100)
MONOCYTES ABSOLUTE: 0.7 K/UL (ref 0.2–0.8)
MONOCYTES RELATIVE PERCENT: 8.6 %
NEUTROPHILS ABSOLUTE: 5.4 K/UL (ref 1.4–6.5)
NEUTROPHILS RELATIVE PERCENT: 68.7 %
PDW BLD-RTO: 15.4 % (ref 11.5–14.5)
PERFORMED ON: ABNORMAL
PERFORMED ON: ABNORMAL
PLATELET # BLD: 322 K/UL (ref 130–400)
POTASSIUM SERPL-SCNC: 3.1 MEQ/L (ref 3.4–4.9)
PROCALCITONIN: 0.19 NG/ML (ref 0–0.15)
RBC # BLD: 3.59 M/UL (ref 4.7–6.1)
SEDIMENTATION RATE, ERYTHROCYTE: 111 MM (ref 0–10)
SODIUM BLD-SCNC: 138 MEQ/L (ref 135–144)
TOTAL PROTEIN: 7 G/DL (ref 6.3–8)
TRIGL SERPL-MCNC: 609 MG/DL (ref 0–150)
TRIGL SERPL-MCNC: 624 MG/DL (ref 0–150)
TRIGL SERPL-MCNC: 696 MG/DL (ref 0–150)
URIC ACID, SERUM: 3.6 MG/DL (ref 3.4–7)
URINE CULTURE, ROUTINE: NORMAL
WBC # BLD: 7.9 K/UL (ref 4.8–10.8)

## 2020-08-04 PROCEDURE — 99232 SBSQ HOSP IP/OBS MODERATE 35: CPT | Performed by: INTERNAL MEDICINE

## 2020-08-04 PROCEDURE — 86141 C-REACTIVE PROTEIN HS: CPT

## 2020-08-04 PROCEDURE — 2580000003 HC RX 258: Performed by: INTERNAL MEDICINE

## 2020-08-04 PROCEDURE — 36415 COLL VENOUS BLD VENIPUNCTURE: CPT

## 2020-08-04 PROCEDURE — 6360000002 HC RX W HCPCS: Performed by: INTERNAL MEDICINE

## 2020-08-04 PROCEDURE — 84145 PROCALCITONIN (PCT): CPT

## 2020-08-04 PROCEDURE — 80053 COMPREHEN METABOLIC PANEL: CPT

## 2020-08-04 PROCEDURE — G0108 DIAB MANAGE TRN  PER INDIV: HCPCS

## 2020-08-04 PROCEDURE — 6370000000 HC RX 637 (ALT 250 FOR IP): Performed by: INTERNAL MEDICINE

## 2020-08-04 PROCEDURE — 83735 ASSAY OF MAGNESIUM: CPT

## 2020-08-04 PROCEDURE — 84550 ASSAY OF BLOOD/URIC ACID: CPT

## 2020-08-04 PROCEDURE — 85025 COMPLETE CBC W/AUTO DIFF WBC: CPT

## 2020-08-04 PROCEDURE — 85652 RBC SED RATE AUTOMATED: CPT

## 2020-08-04 PROCEDURE — 80061 LIPID PANEL: CPT

## 2020-08-04 RX ORDER — ATORVASTATIN CALCIUM 80 MG/1
80 TABLET, FILM COATED ORAL NIGHTLY
Qty: 30 TABLET | Refills: 3 | Status: SHIPPED | OUTPATIENT
Start: 2020-08-04

## 2020-08-04 RX ORDER — FENOFIBRATE 145 MG/1
145 TABLET, COATED ORAL DAILY
Qty: 30 TABLET | Refills: 3 | Status: SHIPPED | OUTPATIENT
Start: 2020-08-04

## 2020-08-04 RX ORDER — POTASSIUM CHLORIDE 20 MEQ/1
40 TABLET, EXTENDED RELEASE ORAL ONCE
Status: DISCONTINUED | OUTPATIENT
Start: 2020-08-04 | End: 2020-08-04 | Stop reason: HOSPADM

## 2020-08-04 RX ORDER — OMEGA-3-ACID ETHYL ESTERS 1 G/1
2 CAPSULE, LIQUID FILLED ORAL 2 TIMES DAILY
Qty: 120 CAPSULE | Refills: 3 | Status: SHIPPED | OUTPATIENT
Start: 2020-08-04

## 2020-08-04 RX ORDER — FENOFIBRATE 145 MG/1
145 TABLET, COATED ORAL DAILY
Qty: 14 TABLET | Refills: 0 | Status: SHIPPED | OUTPATIENT
Start: 2020-08-04 | End: 2022-10-25

## 2020-08-04 RX ORDER — BLOOD SUGAR DIAGNOSTIC
STRIP MISCELLANEOUS
Qty: 200 EACH | Refills: 3 | Status: SHIPPED | OUTPATIENT
Start: 2020-08-04

## 2020-08-04 RX ORDER — ATORVASTATIN CALCIUM 80 MG/1
80 TABLET, FILM COATED ORAL DAILY
Qty: 14 TABLET | Refills: 0 | Status: SHIPPED | OUTPATIENT
Start: 2020-08-04 | End: 2022-10-25

## 2020-08-04 RX ORDER — NAPROXEN 500 MG/1
500 TABLET ORAL 2 TIMES DAILY WITH MEALS
Qty: 20 TABLET | Refills: 0 | Status: SHIPPED | OUTPATIENT
Start: 2020-08-04 | End: 2022-10-25 | Stop reason: ALTCHOICE

## 2020-08-04 RX ORDER — OMEGA-3-ACID ETHYL ESTERS 1 G/1
2 CAPSULE, LIQUID FILLED ORAL 2 TIMES DAILY
Qty: 56 CAPSULE | Refills: 0 | Status: SHIPPED | OUTPATIENT
Start: 2020-08-04 | End: 2022-10-25

## 2020-08-04 RX ADMIN — FENOFIBRATE 160 MG: 160 TABLET ORAL at 08:32

## 2020-08-04 RX ADMIN — Medication 100 MG: at 08:32

## 2020-08-04 RX ADMIN — KETOROLAC TROMETHAMINE 15 MG: 15 INJECTION, SOLUTION INTRAMUSCULAR; INTRAVENOUS at 10:37

## 2020-08-04 RX ADMIN — ENOXAPARIN SODIUM 40 MG: 40 INJECTION SUBCUTANEOUS at 08:32

## 2020-08-04 RX ADMIN — FOLIC ACID 1 MG: 1 TABLET ORAL at 08:32

## 2020-08-04 RX ADMIN — OMEGA-3-ACID ETHYL ESTERS 2 G: 1 CAPSULE, LIQUID FILLED ORAL at 08:32

## 2020-08-04 RX ADMIN — PIPERACILLIN AND TAZOBACTAM 3.38 G: 3; .375 INJECTION, POWDER, LYOPHILIZED, FOR SOLUTION INTRAVENOUS at 05:19

## 2020-08-04 RX ADMIN — Medication 10 ML: at 08:33

## 2020-08-04 RX ADMIN — THERA TABS 1 TABLET: TAB at 08:32

## 2020-08-04 RX ADMIN — KETOROLAC TROMETHAMINE 15 MG: 15 INJECTION, SOLUTION INTRAMUSCULAR; INTRAVENOUS at 01:14

## 2020-08-04 ASSESSMENT — PAIN SCALES - GENERAL
PAINLEVEL_OUTOF10: 0
PAINLEVEL_OUTOF10: 4
PAINLEVEL_OUTOF10: 5
PAINLEVEL_OUTOF10: 0

## 2020-08-04 NOTE — PROGRESS NOTES
Assessment complete this AM. Dr. Linda Harper cleared patient for dc. Diabetes education also came and had meeting with patient. Dr. Tim Limon wrote dc order. Pt given 2 weeks free meds with MAP program. Patient given freestyle glucometer and strips as well. Reviewed dc instructions with patient and girlfriend including f/u appointments, scripts, and MAP scripts as well as where to get them filled. IVs and tele removed. Pt dc home with family.  Electronically signed by Jailyn Barber RN on 8/4/2020 at 3:17 PM

## 2020-08-04 NOTE — PROGRESS NOTES
 omega-3 acid ethyl esters (LOVAZA) capsule 2 g  2 g Oral BID Stanley Del Castillo MD   2 g at 08/03/20 2037    vitamin B-1 (THIAMINE) tablet 100 mg  100 mg Oral Daily [facility]   100 mg at 08/03/20 0802    multivitamin 1 tablet  1 tablet Oral Daily [facility]   1 tablet at 61/89/12 4211    folic acid (FOLVITE) tablet 1 mg  1 mg Oral Daily [facility]   1 mg at 08/03/20 1942    LORazepam (ATIVAN) injection 0.5 mg  0.5 mg Intravenous Q6H PRN [facility]   0.5 mg at 07/31/20 1619    sodium chloride flush 0.9 % injection 10 mL  10 mL Intravenous 2 times per day Asif Osman MD   10 mL at 08/03/20 2040    sodium chloride flush 0.9 % injection 10 mL  10 mL Intravenous PRN Asif Osman MD        polyethylene glycol (GLYCOLAX) packet 17 g  17 g Oral Daily PRN Asif Osman MD        promethazine (PHENERGAN) tablet 12.5 mg  12.5 mg Oral Q6H PRN Asif Osman MD        Or    [facility] injection 4 mg  4 mg Intravenous Q6H PRN Asif Osman MD   4 mg at 08/02/20 0231    enoxaparin (LOVENOX) injection 40 mg  40 mg Subcutaneous Daily Asif Osman MD   40 mg at 08/03/20 0803    glucose (GLUTOSE) 40 % oral gel 15 g  15 g Oral PRN Asif Osman MD        dextrose 50 % IV solution  12.5 g Intravenous PRN Asif Osman MD        glucagon (rDNA) injection 1 mg  1 mg Intramuscular PRN Asif Osman MD        dextrose 5 % solution  100 mL/hr Intravenous PRN Asif Osman MD        ketorolac (TORADOL) injection 15 mg  15 mg Intravenous Q6H PRN Asif Osman MD   15 mg at 08/03/20 1805     No Known Allergies  Active Problems:    Acute pancreatitis    Hypertriglyceridemia    Uncontrolled type 2 diabetes mellitus with hyperglycemia ([facility] [address]  Resolved Problems:    * No resolved hospital problems. *    Blood pressure (!) 140/87, pulse 87, temperature 99.5 °F (37.5 °C), temperature source Oral, resp.  rate 16, height 6' 2\" (1.88 m), weight 252 lb 3.3 oz (114.4 kg), SpO2 97 %.    Subjective:  Symptoms:  Stable. No malaise. Diet:  Poor intake (On clear liquid diet). No nausea or vomiting. Activity level: Returning to normal.    Pain:  He complains of pain that is mild. Objective:  General Appearance:  Comfortable. Vital signs: (most recent): Blood pressure (!) 140/87, pulse 87, temperature 99.5 °F (37.5 °C), temperature source Oral, resp. rate 16, height 6' 2\" (1.88 m), weight 252 lb 3.3 oz (114.4 kg), SpO2 97 %. Vital signs are normal.    HEENT: Normal HEENT exam.    Lungs:  Normal effort. Heart: Normal rate. Abdomen: Abdomen is soft. Extremities: Normal range of motion. Neurological: Patient is alert and oriented to person, place and time. Results for Jen Malone (MRN [id number]) as of 8/3/2020 22:17   Ref. Range 8/3/2020 10:53 8/3/2020 12:01 8/3/2020 13:16 8/3/2020 16:47 8/3/2020 20:46   POC Glucose Latest Ref Range: 60 - 115 mg/dl  167 (H) 178 (H) 241 (H) 159 (H)   Triglycerides Latest Ref Range: 0 - 150 mg/dL 753 (H)           Assessment:    Condition: In stable condition. Improving. (Uncontrolled type 2 diabetes on insulin drip  Hypertriglyceridemia improved to 700 on insulin drip plus oral lipid-lowering medications  Fever improved  ID on consult). Plan:   Discharge home. (Discontinue  insulin drip  Start on Lantus 60 units at night Humalog 15 units with each meals  Accu-Chek with coverage  Monitor lipids in a.m. If able to tolerate solid food could be discharged in the morning on Lipitor fenofibrate and Lovaza  Patient educated regarding getting his blood sugars losing weight and close monitoring of his lipids).        Erlinda Banda MD  8/3/2020

## 2020-08-04 NOTE — PLAN OF CARE
Problem: Pain:  Description: Pain management should include both nonpharmacologic and pharmacologic interventions.   Goal: Pain level will decrease  Outcome: Completed  Goal: Control of acute pain  Outcome: Completed  Goal: Control of chronic pain  Outcome: Completed     Problem: Falls - Risk of:  Goal: Will remain free from falls  Outcome: Completed  Goal: Absence of physical injury  Outcome: Completed

## 2020-08-04 NOTE — PROGRESS NOTES
Hospitalist Progress Note      PCP: Carin Styles DO    Date of Admission: 7/30/2020    Chief Complaint:  No acute events, no fevers in the last 24 hours, tolerating diet well    Medications:  Reviewed    Infusion Medications    dextrose      dextrose       Scheduled Medications    insulin glargine  60 Units Subcutaneous Nightly    insulin lispro  15 Units Subcutaneous TID WC    insulin lispro  0-18 Units Subcutaneous TID WC    insulin lispro  0-9 Units Subcutaneous Nightly    piperacillin-tazobactam  3.375 g Intravenous Q8H    atorvastatin  80 mg Oral Nightly    fenofibrate  160 mg Oral Daily    omega-3 acid ethyl esters  2 g Oral BID    thiamine  100 mg Oral Daily    multivitamin  1 tablet Oral Daily    folic acid  1 mg Oral Daily    sodium chloride flush  10 mL Intravenous 2 times per day    enoxaparin  40 mg Subcutaneous Daily     PRN Meds: neomycin-bacitracin-polymyxin, acetaminophen **OR** acetaminophen, morphine, HYDROmorphone, glucose, dextrose, glucagon (rDNA), dextrose, LORazepam, sodium chloride flush, polyethylene glycol, promethazine **OR** ondansetron, glucose, dextrose, glucagon (rDNA), dextrose, ketorolac      Intake/Output Summary (Last 24 hours) at 8/4/2020 1131  Last data filed at 8/4/2020 1003  Gross per 24 hour   Intake 324 ml   Output --   Net 324 ml       Exam:    /72   Pulse 77   Temp 98.8 °F (37.1 °C) (Oral)   Resp 14   Ht 6' 2\" (1.88 m)   Wt 252 lb 3.3 oz (114.4 kg)   SpO2 96%   BMI 32.38 kg/m²     General appearance: appears stated age and cooperative. Respiratory:  clear to auscultation, bilaterally   Cardiovascular: Regular rate and rhythm with normal S1/S2   Abdomen: Soft, active bowel sounds. Musculoskeletal: No edema bilaterally.         Labs:   Recent Labs     08/02/20  0627 08/03/20  0619 08/04/20  0621   WBC 12.1* 8.5 7.9   HGB 10.8* 10.4* 10.4*   HCT 31.2* 30.9* 31.3*    263 322     Recent Labs     08/02/20  0627 08/03/20  0619 08/04/20  4344 * 137 138   K 3.2* 3.1* 3.1*   CL 99 103 103   CO2 22 22 21   BUN 3* 3* 6   CREATININE 0.68* 0.67* 0.67*   CALCIUM 8.2* 8.3* 8.9     Recent Labs     08/02/20  0627 08/03/20  0619 08/04/20  0621   AST <5 36 44*   ALT <5 41 55*   BILITOT 0.4 0.3 0.3   ALKPHOS 86 91 118*     No results for input(s): INR in the last 72 hours. No results for input(s): Lorrene Rom in the last 72 hours. Urinalysis:      Lab Results   Component Value Date    NITRU Negative 08/02/2020    WBCUA 0-2 08/02/2020    BACTERIA Negative 08/02/2020    RBCUA 0-2 08/02/2020    BLOODU Negative 08/02/2020    SPECGRAV 1.014 08/02/2020    GLUCOSEU 100 08/02/2020       Radiology:  RADIOLOGY REPORT   Final Result      US ABDOMEN LIMITED   Final Result      NO EVIDENCE OF CHOLELITHIASIS OR SIGNIFICANT BILIARY DILATATION. MODERATE HEPATOMEGALY AND STEATOSIS. CT ABDOMEN PELVIS W IV CONTRAST Additional Contrast? None   Final Result      FINDINGS CONSISTENT WITH MILD UNCOMPLICATED ACUTE PANCREATITIS. MODERATE HEPATOMEGALY AND STEATOSIS.                        Assessment/Plan:    Acute pancreatitis   - likely due to severe hypertriglyceridemia  - TG >4400 on admission,  - improved insulin infusion  - started on fenofibrate and Lovaza  - clinically improved, Ok to d/c per endocrinology     Possible sepsis with fevers and leukocytosis  - still febrile, complaining of right knee and left elbow pain, has history of gout  - leukocytosis trending down  - blood and urine cultures no growth  - treated with IV Zosyn  - probably related to acute pancreatitis, possible gout attack  - followed by ID  - stopped antibiotic therapy    Right knee and left elbow pain  - clinically improved with Toradol  - has history of gout  - inflammatory markers are significantly elevated  - short of course of NSAIDS on discharge  - advised to follow up with PCP as outpatient     Pseudohyponatremia  - due to very high TG's  - improved with TG trending

## 2020-08-04 NOTE — PROGRESS NOTES
Jadyn Bhardwaj,  Seen for evaluation and education on Type 2 uncontrolled  Other: new to insulin    Lab Results   Component Value Date    LABA1C 12.9 (H) 07/31/2020     No results found for: EAG    Discussed with patient and his wife, his current diabetes self-care routines prior to this admission. medication compliance: noncompliant much of the time, was not taking his metformin at home, diabetic diet compliance: noncompliant much of the time, reports a diet high in soda, chocolate milk and lacking vegetables, home glucose monitoring: is not performed, meter he has does not work. Provided and reviewed insulin administration via pen and vial/syringe. Able to return demo with no assistance. Handouts provided. Does not have working meter. Provided patient with Freestyle Lite meter and demo'd proper use.  at this time. Wife and patient asked a lot of good questions. Reviewed and provided information. Encouraged them to call the department once home with any needs. Will follow up as outpatient. Patient agreeable. Bedside nurse to assist patient with self injection before going home today. Briefly discussed role of diet, physical activity, daily use of medications and self-monitoring for good diabetes control, provided diabetes education survival packet. Discussed signs and symptoms of hypo/hyperglycemia, BG guidelines, and Rule of 15 - handouts provided. Provided patient with card and contact information for out-patient Diabetes education services and encouraged follow up with a PCP/endocrinologist.    Would recommend follow -up education at outpatient diabetes education at [facility]. Entered order. Patient will need prescriptions for the following supplies at discharge:   Freestyle Lite strips and lancets for four times per day blood glucose testing  Insulin and supplies per DR Melania Alcaraz    Patient verbalizes and demonstrates understanding  of survival skills education.           Adilene Muñoz Baby Maria Isabel

## 2020-08-04 NOTE — DISCHARGE SUMMARY
EVIDENCE OF CHOLELITHIASIS OR SIGNIFICANT BILIARY DILATATION. MODERATE HEPATOMEGALY AND STEATOSIS. Discharge Medications:       [facility] Medication Instructions GMM:[id number]    Printed on:08/04/20 1200   Medication Information                      atorvastatin (LIPITOR) 80 MG tablet  Take 1 tablet by mouth nightly             atorvastatin (LIPITOR) 80 MG tablet  Take 1 tablet by mouth daily for 14 days             fenofibrate (TRICOR) 145 MG tablet  Take 1 tablet by mouth daily             fenofibrate (TRICOR) 145 MG tablet  Take 1 tablet by mouth daily for 14 days             lisinopril-hydrochlorothiazide (PRINZIDE;ZESTORETIC) 20-12.5 MG per tablet  Take 1 tablet by mouth daily             metFORMIN (GLUCOPHAGE) 500 MG tablet  Take 500 mg by mouth 2 times daily (with meals)              naproxen (NAPROSYN) 500 MG tablet  Take 1 tablet by mouth 2 times daily (with meals)             omega-3 acid ethyl esters (LOVAZA) 1 g capsule  Take 2 capsules by mouth 2 times daily             omega-3 acid ethyl esters (LOVAZA) 1 g capsule  Take 2 capsules by mouth 2 times daily for 14 days                 Disposition:   Discharged to Home. Any [facility] needs that were indicated and/or required as been addressed and set up by Social Work. Condition at discharge: Pt was medically stable at the time of discharge. Significant improvement in clinical condition compared to initial condition at presentation to hospital    Activity: activity as tolerated, fall precautions. Total time taken for discharging this patient: 40 minutes. Greater than 70% of time was spent focused exclusively on this patient. Time was taken to review chart, discuss plans with consultants, reconciling medications, discussing plan answering questions with patient.      Kaci Noe  8/4/2020, 12:00 PM  ----------------------------------------------------------------------------------------------------------------------    Earnestine Hodges,     Please return to ER or call 911 if you develop any significant signs or symptoms.     I may not have addressed all of your medical illnesses or the abnormal blood work or imaging therefore please ask your PCP, Quin Matthew DO ,  to obtain [id number] [facility] record to follow up on all of the abnormal labs, imaging and findings that I have and have not addressed during your hospitalization.      Discharging you from the hospital does not mean that your medical care ends here and now. You may still need additional work up, investigation, monitoring, and treatment to be handled from this point on by outside providers including your PCP, Quin Matthew DO , Specialists and other healthcare providers.      Please review your list of discharge medications prior to resuming medications you might still have at home, as the medications you need to be taking, dosages or how often you must take them may have changed. For medication questions, contact your retail pharmacy and your PCP, Quin Matthew DO .     ** I STRONGLY RECOMMEND that you follow up with Quin Matthew DO within 3 to 5 days for a post hospitalization evaluation. This specific office visit is covered by your insurance, and is not the same as your annual doctor visit/ check up. This office visit is important, as it may prevent need for repeat and/or future hospitalizations. **    Your medical team at [facility] ([facility]) appreciates the opportunity to work with you to get well!     Sincerely,  Belle Nevarez

## 2020-08-05 LAB
BLOOD CULTURE, ROUTINE: NORMAL
BLOOD CULTURE, ROUTINE: NORMAL
CULTURE, BLOOD 2: NORMAL

## 2020-08-05 NOTE — PROGRESS NOTES
Flores Puckett is a 47 y.o. male patient. Chief complaint uncontrolled diabetes hypertriglyceridemia  No current facility-administered medications for this encounter. Current Outpatient Medications   Medication Sig Dispense Refill    naproxen (NAPROSYN) 500 MG tablet Take 1 tablet by mouth 2 times daily (with meals) 20 tablet 0    fenofibrate (TRICOR) 145 MG tablet Take 1 tablet by mouth daily 30 tablet 3    atorvastatin (LIPITOR) 80 MG tablet Take 1 tablet by mouth nightly 30 tablet 3    omega-3 acid ethyl esters (LOVAZA) 1 g capsule Take 2 capsules by mouth 2 times daily 120 capsule 3    atorvastatin (LIPITOR) 80 MG tablet Take 1 tablet by mouth daily for 14 days 14 tablet 0    omega-3 acid ethyl esters (LOVAZA) 1 g capsule Take 2 capsules by mouth 2 times daily for 14 days 56 capsule 0    fenofibrate (TRICOR) 145 MG tablet Take 1 tablet by mouth daily for 14 days 14 tablet 0    insulin NPH (NOVOLIN N) 100 UNIT/ML injection vial 60 UNITS AT BEDTIME 2 vial 3    Insulin Syringe-Needle U-100 ([employer] INSULIN SYRINGE) 31G X 5/16\" 1 ML MISC  each 3    insulin regular (NOVOLIN R) 100 UNIT/ML injection 15 UNITS AT EACH MEALS 1 vial 3    insulin NPH (NOVOLIN N) 100 UNIT/ML injection vial 60 UNITS AT BEDTIME 2 vial 3    insulin regular (NOVOLIN R) 100 UNIT/ML injection 15 UNITS TID 1 vial 3    lisinopril-hydrochlorothiazide (PRINZIDE;ZESTORETIC) 20-12.5 MG per tablet Take 1 tablet by mouth daily 90 tablet 1     No Known Allergies  Active Problems:    Acute pancreatitis    Hypertriglyceridemia    Uncontrolled type 2 diabetes mellitus with hyperglycemia (HCC)  Resolved Problems:    * No resolved hospital problems. *    Blood pressure 120/72, pulse 77, temperature 98.8 °F (37.1 °C), temperature source Oral, resp. rate 14, height 6' 2\" (1.88 m), weight 252 lb 3.3 oz (114.4 kg), SpO2 96 %. Subjective:  Symptoms:  Stable. Diet:  Adequate intake.     Activity level: Normal.      Objective:  General hypertriglyceridemia  Resolved  Hypertriglyceridemia last triglycerides in the 600 range  Good p.o. intake without any nausea vomiting). Plan:   Discharge home. (Okay to discharge patient home on NPH 60 units at bedtime regular insulin 15 units with each meals plus Lipitor fenofibrate and Lovaza  Patient to follow-up in the office in 2 weeks time  Advised to test blood sugars 3-4 times daily get blood sugars below 200 A1c of 7 or lower triglyceride goal of less than 250).        Carrie Florian MD  8/4/2020

## 2021-02-23 ENCOUNTER — HOSPITAL ENCOUNTER (EMERGENCY)
Age: 41
Discharge: HOME | End: 2021-02-23
Attending: STUDENT IN AN ORGANIZED HEALTH CARE EDUCATION/TRAINING PROGRAM
Payer: COMMERCIAL

## 2021-02-23 ENCOUNTER — APPOINTMENT (OUTPATIENT)
Dept: GENERAL RADIOLOGY | Age: 41
End: 2021-02-23
Payer: COMMERCIAL

## 2021-02-23 VITALS
TEMPERATURE: 97.8 F | DIASTOLIC BLOOD PRESSURE: 80 MMHG | WEIGHT: 280 LBS | SYSTOLIC BLOOD PRESSURE: 163 MMHG | BODY MASS INDEX: 35.94 KG/M2 | RESPIRATION RATE: 18 BRPM | OXYGEN SATURATION: 95 % | HEART RATE: 85 BPM | HEIGHT: 74 IN

## 2021-02-23 DIAGNOSIS — M25.572 ACUTE LEFT ANKLE PAIN: ICD-10-CM

## 2021-02-23 DIAGNOSIS — L02.811 CUTANEOUS ABSCESS OF HEAD EXCLUDING FACE: Primary | ICD-10-CM

## 2021-02-23 PROCEDURE — 6370000000 HC RX 637 (ALT 250 FOR IP): Performed by: PHYSICIAN ASSISTANT

## 2021-02-23 PROCEDURE — 73610 X-RAY EXAM OF ANKLE: CPT

## 2021-02-23 PROCEDURE — 10060 I&D ABSCESS SIMPLE/SINGLE: CPT

## 2021-02-23 PROCEDURE — 99284 EMERGENCY DEPT VISIT MOD MDM: CPT

## 2021-02-23 RX ORDER — METHYLPREDNISOLONE 4 MG/1
TABLET ORAL
Qty: 1 KIT | Refills: 0 | Status: SHIPPED | OUTPATIENT
Start: 2021-02-23 | End: 2021-03-01

## 2021-02-23 RX ORDER — IBUPROFEN 800 MG/1
800 TABLET ORAL EVERY 8 HOURS PRN
Qty: 20 TABLET | Refills: 0 | Status: SHIPPED | OUTPATIENT
Start: 2021-02-23 | End: 2022-10-25

## 2021-02-23 RX ORDER — SULFAMETHOXAZOLE AND TRIMETHOPRIM 800; 160 MG/1; MG/1
1 TABLET ORAL 2 TIMES DAILY
Qty: 20 TABLET | Refills: 0 | Status: SHIPPED | OUTPATIENT
Start: 2021-02-23 | End: 2021-03-05

## 2021-02-23 RX ORDER — IBUPROFEN 800 MG/1
800 TABLET ORAL ONCE
Status: COMPLETED | OUTPATIENT
Start: 2021-02-23 | End: 2021-02-23

## 2021-02-23 RX ORDER — SULFAMETHOXAZOLE AND TRIMETHOPRIM 800; 160 MG/1; MG/1
1 TABLET ORAL ONCE
Status: COMPLETED | OUTPATIENT
Start: 2021-02-23 | End: 2021-02-23

## 2021-02-23 RX ADMIN — SULFAMETHOXAZOLE AND TRIMETHOPRIM 1 TABLET: 800; 160 TABLET ORAL at 13:29

## 2021-02-23 RX ADMIN — IBUPROFEN 800 MG: 800 TABLET ORAL at 13:29

## 2021-02-23 ASSESSMENT — PAIN SCALES - GENERAL: PAINLEVEL_OUTOF10: 7

## 2021-02-23 ASSESSMENT — PAIN DESCRIPTION - PAIN TYPE: TYPE: ACUTE PAIN

## 2021-02-23 ASSESSMENT — ENCOUNTER SYMPTOMS
EYE PAIN: 0
VOMITING: 0
DIARRHEA: 0
SHORTNESS OF BREATH: 0
NAUSEA: 0
RHINORRHEA: 0
BACK PAIN: 0
PHOTOPHOBIA: 0
SORE THROAT: 0
COUGH: 0
ABDOMINAL PAIN: 0

## 2021-02-23 NOTE — ED TRIAGE NOTES
Pt to ED with c/o left foot pain x 3 days. No known injury. MSPs intact. Also has abscess behind left ear. Drained it two days ago by himself, not improving.

## 2021-02-23 NOTE — ED PROVIDER NOTES
[address] [facility] ED  EMERGENCY DEPARTMENT ENCOUNTER      Pt Name: Aintra Hardy  MRN: [id number]  [place] 1980  Date of evaluation: 2/23/2021  Provider: Betty Nicolas PA-C      HISTORY OF PRESENT ILLNESS    Anitra Hardy is a 46 y.o. male who presents to the Emergency Department with left foot pain x3 days with no injury. It is in her inner ankle. Tried some Tylenol yesterday nothing today. Been using crutches to help as the pain gets worse with ambulation. Patient also complains of an abscess behind his right ear states he drained it 2 days ago but it got larger. He denies any fevers chills nausea vomiting. REVIEW OF SYSTEMS       Review of Systems   Constitutional: Negative for chills, diaphoresis, fatigue and fever. HENT: Negative for congestion, rhinorrhea and sore throat. Eyes: Negative for photophobia and pain. Respiratory: Negative for cough and shortness of breath. Cardiovascular: Negative for chest pain and palpitations. Gastrointestinal: Negative for abdominal pain, diarrhea, nausea and vomiting. Genitourinary: Negative for dysuria and flank pain. Musculoskeletal: Positive for arthralgias. Negative for back pain. Skin: Negative for rash. Abscess behind right ear   Neurological: Negative for dizziness, light-headedness and headaches. Psychiatric/Behavioral: Negative. All other systems reviewed and are negative.         PAST MEDICAL HISTORY     Past Medical History:   Diagnosis Date    Depression     Diabetes mellitus ([facility] [address]     Hyperlipidemia     Hypertension          SURGICAL HISTORY       Past Surgical History:   Procedure Laterality Date    COLONOSCOPY  6/12/15    w/polypectomy     UPPER GASTROINTESTINAL ENDOSCOPY  6/12/15    w/bx,dilation          CURRENT MEDICATIONS       Discharge Medication List as of 2/23/2021  2:26 PM      CONTINUE these medications which have NOT CHANGED    Details   naproxen (NAPROSYN) 500 MG tablet Take 1 tablet by mouth 2 times daily (with meals), Disp-20 tablet,R-0Print      fenofibrate (TRICOR) 145 MG tablet Take 1 tablet by mouth daily, Disp-30 tablet,R-3Normal      atorvastatin (LIPITOR) 80 MG tablet Take 1 tablet by mouth nightly, Disp-30 tablet,R-3Normal      omega-3 acid ethyl esters (LOVAZA) 1 g capsule Take 2 capsules by mouth 2 times daily, Disp-120 capsule,R-3Normal      !! insulin NPH (NOVOLIN N) 100 UNIT/ML injection vial 60 UNITS AT BEDTIME, Disp-2 vial,R-3Print      Insulin Syringe-Needle U-100 ([employer] INSULIN SYRINGE) 31G X 5/16\" 1 ML MISC Disp-200 each,R-3, PrintQID      !! insulin regular (NOVOLIN R) 100 UNIT/ML injection 15 UNITS AT [facility] MEALS, Disp-1 vial,R-3Print      !! insulin NPH (NOVOLIN N) 100 UNIT/ML injection vial 60 UNITS AT BEDTIME, Disp-2 vial,R-3Print      !! insulin regular (NOVOLIN R) 100 UNIT/ML injection 15 UNITS TID, Disp-1 vial,R-3Print      lisinopril-hydrochlorothiazide (PRINZIDE;ZESTORETIC) 20-12.5 MG per tablet Take 1 tablet by mouth daily, Disp-90 tablet, R-1       !! - Potential duplicate medications found. Please discuss with provider. ALLERGIES     Patient has no known allergies.     FAMILY HISTORY       Family History   Problem Relation Age of Onset    High Blood Pressure Father           SOCIAL HISTORY       Social History     Socioeconomic History    Marital status: Single     Spouse name: None    Number of children: None    Years of education: None    Highest education level: None   Occupational History    None   Social Needs    Financial resource strain: None    Food insecurity     Worry: None     Inability: None    Transportation needs     Medical: None     Non-medical: None   Tobacco Use    Smoking status: Current Every Day Smoker     Packs/day: 0.50     Last attempt to quit: 2014     Years since quittin.5    Smokeless tobacco: Never Used   Substance and Sexual Activity    Alcohol use: Yes     Comment: weekends    Drug use: No    Sexual activity: None   Lifestyle    Physical activity     Days per week: None     Minutes per session: None    Stress: None   Relationships    Social connections     Talks on phone: None     Gets together: None     Attends Druze service: None     Active member of club or organization: None     Attends meetings of clubs or organizations: None     Relationship status: None    Intimate partner violence     Fear of current or ex partner: None     Emotionally abused: None     Physically abused: None     Forced sexual activity: None   Other Topics Concern    None   Social History Narrative    None       SCREENINGS             PHYSICAL EXAM    (up to 7 for level 4, 8 or more for level 5)     ED Triage Vitals [02/23/21 1225]   BP Temp Temp Source Pulse Resp SpO2 Height Weight   (!) 163/80 97.8 °F (36.6 °C) Temporal 85 18 95 % 6' 2\" (1.88 m) 280 lb (127 kg)       Physical Exam  Vitals signs and nursing note reviewed. Constitutional:       General: He is not in acute distress. Appearance: Normal appearance. He is well-developed. He is not diaphoretic. HENT:      Head: Normocephalic and atraumatic. Eyes:      General: Lids are normal.      Conjunctiva/sclera: Conjunctivae normal.   Neck:      Musculoskeletal: Normal range of motion and neck supple. Cardiovascular:      Rate and Rhythm: Normal rate and regular rhythm. Pulses: Normal pulses. Heart sounds: Normal heart sounds. Pulmonary:      Effort: Pulmonary effort is normal.      Breath sounds: Normal breath sounds. Abdominal:      General: Bowel sounds are normal.      Palpations: Abdomen is soft. Tenderness: There is no abdominal tenderness. Musculoskeletal:      Left ankle: Tenderness. Feet:       Comments: No bruising swelling. Tenderness to palpation   Lymphadenopathy:      Cervical: No cervical adenopathy. Skin:     General: Skin is warm and dry.       Capillary Refill: Capillary refill takes less than 2 seconds. Findings: No rash. Neurological:      Mental Status: He is alert and oriented to person, place, and time. Psychiatric:         Thought Content: Thought content normal.         Judgment: Judgment normal.           All other labs were within normal range or not returned as of this dictation. EMERGENCY DEPARTMENT COURSE and DIFFERENTIALDIAGNOSIS/MDM:   Vitals:    Vitals:    02/23/21 1225   BP: (!) 163/80   Pulse: 85   Resp: 18   Temp: 97.8 °F (36.6 °C)   TempSrc: Temporal   SpO2: 95%   Weight: 280 lb (127 kg)   Height: 6' 2\" (1.88 m)            Patient is nontoxic no acute distress afebrile hemodynamically stable. Abscess was drained lots of fluid. Started on antibiotics. X-ray negative of left ankle will treat with supportive care follow-up with family doctor return to the ED for any worsening concerning symptoms. Patient verbalized understanding patient stable for discharge. PROCEDURES:  Unless otherwise noted below, none     Incision/Drainage    Date/Time: 2/23/2021 5:20 PM  Performed by: Remington Barragan PA-C  Authorized by: Marylou Villaseñor DO     Consent:     Consent obtained:  Verbal    Consent given by:  Patient    Risks discussed:  Bleeding, incomplete drainage and infection    Alternatives discussed:  No treatment and delayed treatment  Location:     Type:  Abscess    Location:  Head    Head location:  R external ear  Pre-procedure details:     Skin preparation:  Chloraprep  Anesthesia (see MAR for exact dosages): Anesthesia method:  None  Procedure type:     Complexity:  Simple  Procedure details:     Needle aspiration: no      Incision types:  Stab incision    Incision depth:  Dermal    Scalpel blade:  11    Wound management:  Irrigated with saline    Drainage:  Purulent    Drainage amount: Moderate    Wound treatment:  Wound left open    Packing materials:  None  Post-procedure details:     Patient tolerance of procedure:   Tolerated well, no immediate complications          FINAL IMPRESSION      1. Cutaneous abscess of head excluding face    2.  Acute left ankle pain          DISPOSITION/PLAN   DISPOSITION Decision To Discharge 02/23/2021 02:16:56 PM          Eben Llanes (electronically signed)  Attending Emergency Physician       Viktor Garcia PA-C  02/23/21 2524

## 2021-10-07 ENCOUNTER — HOSPITAL ENCOUNTER (EMERGENCY)
Age: 41
Discharge: LEFT BEFORE DISCHARGE | End: 2021-10-07
Attending: EMERGENCY MEDICINE
Payer: COMMERCIAL

## 2021-10-07 VITALS
DIASTOLIC BLOOD PRESSURE: 83 MMHG | BODY MASS INDEX: 35.94 KG/M2 | SYSTOLIC BLOOD PRESSURE: 131 MMHG | OXYGEN SATURATION: 99 % | RESPIRATION RATE: 16 BRPM | HEIGHT: 74 IN | TEMPERATURE: 98.6 F | WEIGHT: 280 LBS | HEART RATE: 93 BPM

## 2021-10-07 ASSESSMENT — PAIN DESCRIPTION - LOCATION: LOCATION: KNEE;FOOT;ELBOW

## 2021-10-07 ASSESSMENT — PAIN DESCRIPTION - FREQUENCY: FREQUENCY: CONTINUOUS

## 2021-10-07 ASSESSMENT — PAIN DESCRIPTION - PAIN TYPE: TYPE: ACUTE PAIN

## 2021-10-07 ASSESSMENT — PAIN SCALES - GENERAL: PAINLEVEL_OUTOF10: 2

## 2021-10-07 ASSESSMENT — PAIN DESCRIPTION - DESCRIPTORS: DESCRIPTORS: DISCOMFORT

## 2021-10-07 ASSESSMENT — PAIN DESCRIPTION - ORIENTATION: ORIENTATION: LEFT;RIGHT

## 2021-10-07 NOTE — ED NOTES
The patient told registration that he is going to leave.         Julio César Pruitt RN  10/07/21 0298

## 2021-10-07 NOTE — ED TRIAGE NOTES
Pt c/o bilat knee, heels, and left elbow pain, denies trauma, Hx of gout, states he is out of his medication, sensation and movement intact throughout extremities, Pt is A&OX3, calm, ambulatory, afebrile, breathes are equal and unlabored,

## 2022-10-15 ENCOUNTER — APPOINTMENT (OUTPATIENT)
Dept: CT IMAGING | Age: 42
End: 2022-10-15
Payer: COMMERCIAL

## 2022-10-15 ENCOUNTER — HOSPITAL ENCOUNTER (EMERGENCY)
Age: 42
Discharge: HOME | End: 2022-10-15
Payer: COMMERCIAL

## 2022-10-15 VITALS
TEMPERATURE: 97.3 F | RESPIRATION RATE: 16 BRPM | DIASTOLIC BLOOD PRESSURE: 83 MMHG | HEART RATE: 75 BPM | OXYGEN SATURATION: 100 % | SYSTOLIC BLOOD PRESSURE: 143 MMHG

## 2022-10-15 DIAGNOSIS — R82.90 MALODOROUS URINE: ICD-10-CM

## 2022-10-15 DIAGNOSIS — M54.50 MIDLINE LOW BACK PAIN WITHOUT SCIATICA, UNSPECIFIED CHRONICITY: Primary | ICD-10-CM

## 2022-10-15 LAB
ALBUMIN SERPL-MCNC: 4.5 G/DL (ref 3.5–4.6)
ALP BLD-CCNC: 111 U/L (ref 35–104)
ALT SERPL-CCNC: 35 U/L (ref 0–41)
ANION GAP SERPL CALCULATED.3IONS-SCNC: 13 MEQ/L (ref 9–15)
AST SERPL-CCNC: 32 U/L (ref 0–40)
BASOPHILS ABSOLUTE: 0 K/UL (ref 0–0.2)
BASOPHILS RELATIVE PERCENT: 0.5 %
BILIRUB SERPL-MCNC: 0.3 MG/DL (ref 0.2–0.7)
BILIRUBIN URINE: NEGATIVE
BLOOD, URINE: NEGATIVE
BUN BLDV-MCNC: 11 MG/DL (ref 6–20)
CALCIUM SERPL-MCNC: 9.1 MG/DL (ref 8.5–9.9)
CHLORIDE BLD-SCNC: 99 MEQ/L (ref 95–107)
CLARITY: CLEAR
CO2: 23 MEQ/L (ref 20–31)
COLOR: YELLOW
CREAT SERPL-MCNC: 0.79 MG/DL (ref 0.7–1.2)
EOSINOPHILS ABSOLUTE: 0.1 K/UL (ref 0–0.7)
EOSINOPHILS RELATIVE PERCENT: 1.7 %
GFR AFRICAN AMERICAN: >60
GFR NON-AFRICAN AMERICAN: >60
GLOBULIN: 3.5 G/DL (ref 2.3–3.5)
GLUCOSE BLD-MCNC: 199 MG/DL (ref 70–99)
GLUCOSE URINE: >=1000 MG/DL
HCT VFR BLD CALC: 39.3 % (ref 42–52)
HEMOGLOBIN: 13.8 G/DL (ref 14–18)
KETONES, URINE: NEGATIVE MG/DL
LEUKOCYTE ESTERASE, URINE: NEGATIVE
LYMPHOCYTES ABSOLUTE: 2.3 K/UL (ref 1–4.8)
LYMPHOCYTES RELATIVE PERCENT: 30.5 %
MCH RBC QN AUTO: 30.1 PG (ref 27–31.3)
MCHC RBC AUTO-ENTMCNC: 35.1 % (ref 33–37)
MCV RBC AUTO: 85.7 FL (ref 80–100)
MONOCYTES ABSOLUTE: 0.5 K/UL (ref 0.2–0.8)
MONOCYTES RELATIVE PERCENT: 7 %
NEUTROPHILS ABSOLUTE: 4.6 K/UL (ref 1.4–6.5)
NEUTROPHILS RELATIVE PERCENT: 60.3 %
NITRITE, URINE: NEGATIVE
PDW BLD-RTO: 13.8 % (ref 11.5–14.5)
PH UA: 7.5 (ref 5–9)
PLATELET # BLD: 269 K/UL (ref 130–400)
POTASSIUM SERPL-SCNC: 3.8 MEQ/L (ref 3.4–4.9)
PROTEIN UA: NEGATIVE MG/DL
RBC # BLD: 4.58 M/UL (ref 4.7–6.1)
SODIUM BLD-SCNC: 135 MEQ/L (ref 135–144)
SPECIFIC GRAVITY UA: 1.04 (ref 1–1.03)
TOTAL PROTEIN: 8 G/DL (ref 6.3–8)
URINE REFLEX TO CULTURE: ABNORMAL
UROBILINOGEN, URINE: 0.2 E.U./DL
WBC # BLD: 7.6 K/UL (ref 4.8–10.8)

## 2022-10-15 PROCEDURE — 96374 THER/PROPH/DIAG INJ IV PUSH: CPT

## 2022-10-15 PROCEDURE — 81003 URINALYSIS AUTO W/O SCOPE: CPT

## 2022-10-15 PROCEDURE — 6360000002 HC RX W HCPCS: Performed by: PHYSICIAN ASSISTANT

## 2022-10-15 PROCEDURE — 96375 TX/PRO/DX INJ NEW DRUG ADDON: CPT

## 2022-10-15 PROCEDURE — 74150 CT ABDOMEN W/O CONTRAST: CPT

## 2022-10-15 PROCEDURE — 36415 COLL VENOUS BLD VENIPUNCTURE: CPT

## 2022-10-15 PROCEDURE — 85025 COMPLETE CBC W/AUTO DIFF WBC: CPT

## 2022-10-15 PROCEDURE — 99284 EMERGENCY DEPT VISIT MOD MDM: CPT

## 2022-10-15 PROCEDURE — 80053 COMPREHEN METABOLIC PANEL: CPT

## 2022-10-15 RX ORDER — CYCLOBENZAPRINE HCL 10 MG
10 TABLET ORAL 3 TIMES DAILY PRN
Qty: 15 TABLET | Refills: 0 | Status: SHIPPED | OUTPATIENT
Start: 2022-10-15 | End: 2022-10-25

## 2022-10-15 RX ORDER — KETOROLAC TROMETHAMINE 30 MG/ML
30 INJECTION, SOLUTION INTRAMUSCULAR; INTRAVENOUS ONCE
Status: COMPLETED | OUTPATIENT
Start: 2022-10-15 | End: 2022-10-15

## 2022-10-15 RX ORDER — ETODOLAC 400 MG/1
400 TABLET, FILM COATED ORAL 2 TIMES DAILY
Qty: 14 TABLET | Refills: 0 | Status: SHIPPED | OUTPATIENT
Start: 2022-10-15

## 2022-10-15 RX ORDER — ORPHENADRINE CITRATE 30 MG/ML
60 INJECTION INTRAMUSCULAR; INTRAVENOUS ONCE
Status: COMPLETED | OUTPATIENT
Start: 2022-10-15 | End: 2022-10-15

## 2022-10-15 RX ADMIN — KETOROLAC TROMETHAMINE 30 MG: 30 INJECTION, SOLUTION INTRAMUSCULAR at 16:41

## 2022-10-15 RX ADMIN — ORPHENADRINE CITRATE 60 MG: 30 INJECTION INTRAMUSCULAR; INTRAVENOUS at 16:39

## 2022-10-15 ASSESSMENT — ENCOUNTER SYMPTOMS
BACK PAIN: 1
EYE PAIN: 0
ALLERGIC/IMMUNOLOGIC NEGATIVE: 1
ABDOMINAL PAIN: 0
TROUBLE SWALLOWING: 0
SHORTNESS OF BREATH: 0
COLOR CHANGE: 0
APNEA: 0

## 2022-10-15 ASSESSMENT — PAIN SCALES - GENERAL: PAINLEVEL_OUTOF10: 2

## 2022-10-15 ASSESSMENT — PAIN - FUNCTIONAL ASSESSMENT: PAIN_FUNCTIONAL_ASSESSMENT: 0-10

## 2022-10-15 ASSESSMENT — LIFESTYLE VARIABLES
HOW MANY STANDARD DRINKS CONTAINING ALCOHOL DO YOU HAVE ON A TYPICAL DAY: 5 OR 6
HOW OFTEN DO YOU HAVE A DRINK CONTAINING ALCOHOL: 4 OR MORE TIMES A WEEK

## 2022-10-15 ASSESSMENT — PAIN DESCRIPTION - ORIENTATION: ORIENTATION: RIGHT;LEFT

## 2022-10-15 ASSESSMENT — PAIN DESCRIPTION - LOCATION: LOCATION: BACK

## 2022-10-15 NOTE — ED PROVIDER NOTES
[address] [facility] ED  eMERGENCYdEPARTMENT eNCOUnter      Pt Name: Anibal De Los Santos  MRN: [id number]  aMru 1980  Date of evaluation: 10/15/2022  Provider:Israel Malcolm PA-C    CHIEF COMPLAINT       Chief Complaint   Patient presents with    Back Pain     With foul smelling urine         HISTORY OF PRESENT ILLNESS  (Location/Symptom, Timing/Onset, Context/Setting, Quality, Duration, Modifying Factors, Severity.)   Anibal De Los Santos is a 38 y.o. male who presents to the emergency department with complaints of 1 to 2-month history of intermittent episodes of lower back pain and patient also states that for the past 1 to 2 weeks he has noticed malodorous urine. He denies any hematuria or se dysuria. Patient denies any abdominal pain, nausea or vomiting. No saddle paresthesias or loss of bowel or bladder control. HPI    Nursing Notes were reviewed and I agree. REVIEW OF SYSTEMS    (2-9 systems for level 4, 10 or more for level 5)     Review of Systems   Constitutional:  Negative for diaphoresis and fever. HENT:  Negative for hearing loss and trouble swallowing. Eyes:  Negative for pain. Respiratory:  Negative for apnea and shortness of breath. Cardiovascular:  Negative for chest pain. Gastrointestinal:  Negative for abdominal pain. Endocrine: Negative. Genitourinary:  Negative for hematuria. Musculoskeletal:  Positive for back pain. Negative for neck pain and neck stiffness. Skin:  Negative for color change. Allergic/Immunologic: Negative. Neurological:  Negative for dizziness and numbness. Hematological: Negative. Psychiatric/Behavioral: Negative. All other systems reviewed and are negative. Except as noted above the remainder of the review of systems was reviewed and negative.        PAST MEDICAL HISTORY     Past Medical History:   Diagnosis Date    Depression     Diabetes mellitus ([address]     Hyperlipidemia     Hypertension          SURGICAL HISTORY       Past Surgical History:   Procedure Laterality Date    COLONOSCOPY  6/12/15    w/polypectomy     UPPER GASTROINTESTINAL ENDOSCOPY  6/12/15    w/bx,dilation          CURRENT MEDICATIONS       Previous Medications    ATORVASTATIN (LIPITOR) 80 MG TABLET    Take 1 tablet by mouth nightly    ATORVASTATIN (LIPITOR) 80 MG TABLET    Take 1 tablet by mouth daily for 14 days    FENOFIBRATE (TRICOR) 145 MG TABLET    Take 1 tablet by mouth daily    FENOFIBRATE (TRICOR) 145 MG TABLET    Take 1 tablet by mouth daily for 14 days    IBUPROFEN (ADVIL;MOTRIN) 800 MG TABLET    Take 1 tablet by mouth every 8 hours as needed for Pain    INSULIN NPH (NOVOLIN N) 100 UNIT/ML INJECTION VIAL    60 UNITS AT BEDTIME    INSULIN NPH (NOVOLIN N) 100 UNIT/ML INJECTION VIAL    60 UNITS AT BEDTIME    INSULIN REGULAR (NOVOLIN R) 100 UNIT/ML INJECTION    15 UNITS AT [facility] MEALS    INSULIN REGULAR (NOVOLIN R) 100 UNIT/ML INJECTION    15 UNITS TID    INSULIN SYRINGE-NEEDLE U-100 ([employer] INSULIN SYRINGE) 31G X 5/16\" 1 ML MISC    QID    LISINOPRIL-HYDROCHLOROTHIAZIDE (PRINZIDE;ZESTORETIC) 20-12.5 MG PER TABLET    Take 1 tablet by mouth daily    NAPROXEN (NAPROSYN) 500 MG TABLET    Take 1 tablet by mouth 2 times daily (with meals)    OMEGA-3 ACID ETHYL ESTERS (LOVAZA) 1 G CAPSULE    Take 2 capsules by mouth 2 times daily    OMEGA-3 ACID ETHYL ESTERS (LOVAZA) 1 G CAPSULE    Take 2 capsules by mouth 2 times daily for 14 days       ALLERGIES     Patient has no known allergies.     FAMILY HISTORY       Family History   Problem Relation Age of Onset    High Blood Pressure Father           SOCIAL HISTORY       Social History     Socioeconomic History    Marital status: Single     Spouse name: None    Number of children: None    Years of education: None    Highest education level: None   Tobacco Use    Smoking status: Every Day     Packs/day: 0.50     Types: Cigarettes     Last attempt to quit: 2014     Years since quittin.1    Smokeless tobacco: Never   Vaping Use    Vaping Use: Never used   Substance and Sexual Activity    Alcohol use: Yes     Comment: weekends    Drug use: No       SCREENINGS    [place] Coma Scale  Eye Opening: Spontaneous  Best Verbal Response: Oriented  Best Motor Response: Obeys commands  Liban Coma Scale Score: 15      PHYSICAL EXAM    (up to 7 forlevel 4, 8 or more for level 5)     ED Triage Vitals [10/15/22 1416]   BP Temp Temp Source Heart Rate Resp SpO2 Height Weight   (!) 143/83 97.3 °F (36.3 °C) Temporal 75 16 100 % -- --       Physical Exam  Vitals and nursing note reviewed. Constitutional:       General: He is not in acute distress. Appearance: He is well-developed. He is not diaphoretic. HENT:      Head: Normocephalic and atraumatic. Mouth/Throat:      Mouth: Mucous membranes are moist.      Pharynx: No oropharyngeal exudate. Eyes:      General: No scleral icterus. Conjunctiva/sclera: Conjunctivae normal.      Pupils: Pupils are equal, round, and reactive to light. Neck:      Trachea: No tracheal deviation. Cardiovascular:      Rate and Rhythm: Normal rate. Heart sounds: Normal heart sounds. Pulmonary:      Effort: Pulmonary effort is normal. No respiratory distress. Breath sounds: Normal breath sounds. Abdominal:      General: Bowel sounds are normal. There is no distension. Palpations: Abdomen is soft. Musculoskeletal:         General: Normal range of motion. Cervical back: Normal range of motion and neck supple. No rigidity or tenderness. Lumbar back: Tenderness present. Back:    Skin:     General: Skin is warm and dry. Findings: No erythema or rash. Neurological:      Mental Status: He is alert and oriented to person, place, and time. Cranial Nerves: No cranial nerve deficit. Motor: No abnormal muscle tone. Psychiatric:         Behavior: Behavior normal.         Thought Content:  Thought content normal.         Judgment: Judgment normal.         DIAGNOSTIC RESULTS     RADIOLOGY:   Non-plain film images such as CT, Ultrasound and MRI are read by the radiologist. Plain radiographic images are visualized and preliminarilyinterpreted by Thomas Sierra PA-C with the below findings:        Interpretation per the Radiologist below, if available at the time of this note:    Rl 23   Final Result   1. Hepatomegaly and moderate to severe diffuse hepatic steatosis. 2.  Gallbladder and bile ducts appear normal.      3.  Pancreas is normal in size and demonstrates normal morphology without   surrounding inflammation. 4.  Right nephrolithiasis without renal obstruction. LABS:  Labs Reviewed   URINALYSIS WITH REFLEX TO CULTURE - Abnormal; Notable for the following components:       Result Value    Glucose, Ur >=1000 (*)     All other components within normal limits   COMPREHENSIVE METABOLIC PANEL - Abnormal; Notable for the following components:    Glucose 199 (*)     Alkaline Phosphatase 111 (*)     All other components within normal limits   CBC WITH AUTO DIFFERENTIAL - Abnormal; Notable for the following components:    RBC 4.58 (*)     Hemoglobin 13.8 (*)     Hematocrit 39.3 (*)     All other components within normal limits       All other labs were within normal range or not returnedas of this dictation. EMERGENCYDEPARTMENT COURSE and DIFFERENTIAL DIAGNOSIS/MDM:   Vitals:    Vitals:    10/15/22 1416   BP: (!) 143/83   Pulse: 75   Resp: 16   Temp: 97.3 °F (36.3 °C)   TempSrc: Temporal   SpO2: 100%       REASSESSMENT        Patient presented the emergency department with complaints of severe malodorous urine as well as lower back pain. CT scan is unremarkable and urinalysis shows no UTI. Laboratory testing normal as well. Patient very concerned about the strong odor of urine and will be referred to urology.   PCP follow-up for back pain as I believe this is myofascial in nature    MDM      PROCEDURES:    Procedures      FINAL IMPRESSION      1. Midline low back pain without sciatica, unspecified chronicity    2. Malodorous urine          DISPOSITION/PLAN   DISPOSITION Decision To Discharge 10/15/2022 04:28:08 PM      PATIENT REFERRED TO:  Huber Nguyen MD  [address]    Call in 2 days      ALICIA [phone]    Call in 2 days      DISCHARGE MEDICATIONS:  New Prescriptions    CYCLOBENZAPRINE (FLEXERIL) 10 MG TABLET    Take 1 tablet by mouth 3 times daily as needed for Muscle spasms    ETODOLAC (LODINE) 400 MG TABLET    Take 1 tablet by mouth 2 times daily       (Please note that portions of this note were completed with a voice recognition program.  Efforts were made to edit the dictations but occasionally words are mis-transcribed.)    JUAN MANUEL Pizano PA-C  10/15/22 0378

## 2022-10-20 ENCOUNTER — TELEPHONE (OUTPATIENT)
Dept: UROLOGY | Age: 42
End: 2022-10-20

## 2022-10-20 NOTE — TELEPHONE ENCOUNTER
CT KIDNEY WO CONTRAST   10/15/2022    Organs: There is moderate hepatomegaly and moderate to severe diffuse hepatic   steatosis. Spleen is normal.  Stomach appears normal.  Pancreas is normal.   No peripancreatic inflammation. Pancreatic duct is normal.  Gallbladder and   bile ducts appear normal.  The adrenal glands appear normal.  There is right   nephrolithiasis. No evidence of renal obstruction. Impression   1. Hepatomegaly and moderate to severe diffuse hepatic steatosis. 2.  Gallbladder and bile ducts appear normal.       3.  Pancreas is normal in size and demonstrates normal morphology without   surrounding inflammation. 4.  Right nephrolithiasis without renal obstruction. Would you like a KUB prior to appointment?

## 2022-10-25 ENCOUNTER — OFFICE VISIT (OUTPATIENT)
Dept: UROLOGY | Age: 42
End: 2022-10-25
Payer: COMMERCIAL

## 2022-10-25 VITALS
WEIGHT: 275 LBS | SYSTOLIC BLOOD PRESSURE: 138 MMHG | BODY MASS INDEX: 35.29 KG/M2 | HEIGHT: 74 IN | OXYGEN SATURATION: 97 % | DIASTOLIC BLOOD PRESSURE: 88 MMHG | HEART RATE: 83 BPM

## 2022-10-25 DIAGNOSIS — R82.90 BAD ODOR OF URINE: Primary | ICD-10-CM

## 2022-10-25 LAB
BILIRUBIN, POC: ABNORMAL
BLOOD URINE, POC: ABNORMAL
CLARITY, POC: CLEAR
COLOR, POC: YELLOW
GLUCOSE URINE, POC: >=1
KETONES, POC: ABNORMAL
LEUKOCYTE EST, POC: ABNORMAL
NITRITE, POC: ABNORMAL
PH, POC: 5.5
PROTEIN, POC: ABNORMAL
SPECIFIC GRAVITY, POC: 1.01
UROBILINOGEN, POC: 0.2

## 2022-10-25 PROCEDURE — 99203 OFFICE O/P NEW LOW 30 MIN: CPT | Performed by: UROLOGY

## 2022-10-25 PROCEDURE — G8419 CALC BMI OUT NRM PARAM NOF/U: HCPCS | Performed by: UROLOGY

## 2022-10-25 PROCEDURE — G8427 DOCREV CUR MEDS BY ELIG CLIN: HCPCS | Performed by: UROLOGY

## 2022-10-25 PROCEDURE — 4004F PT TOBACCO SCREEN RCVD TLK: CPT | Performed by: UROLOGY

## 2022-10-25 PROCEDURE — G8484 FLU IMMUNIZE NO ADMIN: HCPCS | Performed by: UROLOGY

## 2022-10-25 PROCEDURE — 81003 URINALYSIS AUTO W/O SCOPE: CPT | Performed by: UROLOGY

## 2022-10-25 NOTE — PROGRESS NOTES
[facility] EVALUATION NOTE                                                 H&P          Note:  Assessment and plan  Incidental right nephrolith noted on CT scan done for back pain  No previous history of stone disease and/or renal colic  CT reviewed with patient less than 1 mm calcium deposit within the renal parenchyma  No further work-up required      The note below is complete evaluation of patient on follow-up/consultation                                                                                                                                                 Reason for Visit  Incidental nephrolith noted on CT    History of Present Illness  38year-old who had a CT scan done for back pain which showed an incidental nephrolith right lower pole right kidney with no evidence of hydronephrosis      Urologic Review of Systems/Symptoms  No issues with voiding, no previous history of renal colic    Review of Systems  Hospitalization: None  All 14 categories of Review of Systems otherwise reviewed no other findings reported.   Review of systems unremarkable  Past Medical History:   Diagnosis Date    Depression     Diabetes mellitus ([facility] [address]     Hyperlipidemia     Hypertension      Past Surgical History:   Procedure Laterality Date    COLONOSCOPY  6/12/15    w/polypectomy     UPPER GASTROINTESTINAL ENDOSCOPY  6/12/15    w/bx,dilation      Social History     Socioeconomic History    Marital status: Single     Spouse name: None    Number of children: None    Years of education: None    Highest education level: None   Tobacco Use    Smoking status: Every Day     Packs/day: 0.50     Types: Cigarettes     Last attempt to quit: 2014     Years since quittin.2    Smokeless tobacco: Never   Vaping Use    Vaping Use: Never used   Substance and Sexual Activity    Alcohol use: Yes     Comment: weekends    Drug use: No     Family History   Problem Relation Age of Onset    High Blood Pressure Father      Current Outpatient Medications   Medication Sig Dispense Refill    ALLOPURINOL PO Take by mouth      fenofibrate (TRICOR) 145 MG tablet Take 1 tablet by mouth daily 30 tablet 3    atorvastatin (LIPITOR) 80 MG tablet Take 1 tablet by mouth nightly 30 tablet 3    omega-3 acid ethyl esters (LOVAZA) 1 g capsule Take 2 capsules by mouth 2 times daily 120 capsule 3    fenofibrate (TRICOR) 145 MG tablet Take 1 tablet by mouth daily for 14 days 14 tablet 0    insulin NPH (NOVOLIN N) 100 UNIT/ML injection vial 60 UNITS AT BEDTIME 2 vial 3    Insulin Syringe-Needle U-100 ([employer] INSULIN SYRINGE) 31G X 5/16\" 1 ML MISC  each 3    insulin regular (NOVOLIN R) 100 UNIT/ML injection 15 UNITS AT EACH MEALS 1 vial 3    insulin NPH (NOVOLIN N) 100 UNIT/ML injection vial 60 UNITS AT BEDTIME 2 vial 3    insulin regular (NOVOLIN R) 100 UNIT/ML injection 15 UNITS TID 1 vial 3    lisinopril-hydrochlorothiazide (PRINZIDE;ZESTORETIC) 20-12.5 MG per tablet Take 1 tablet by mouth daily 90 tablet 1    etodolac (LODINE) 400 MG tablet Take 1 tablet by mouth 2 times daily (Patient not taking: Reported on 10/25/2022) 14 tablet 0    cyclobenzaprine (FLEXERIL) 10 MG tablet Take 1 tablet by mouth 3 times daily as needed for Muscle spasms (Patient not taking: Reported on 10/25/2022) 15 tablet 0     No current facility-administered medications for this visit. Patient has no known allergies.   All reviewed and verified by Dr Cyndee Larsen on today's visit    No results found for: PSA, PSADIA  Results for POC orders placed in visit on 10/25/22   POCT Urinalysis No Micro (Auto)   Result Value Ref Range    Color, UA yellow     Clarity, UA clear     Glucose, UA POC >=1.000     Bilirubin, UA neg     Ketones, UA neg     Spec Grav, UA 1.010     Blood, UA POC trace-intact     pH, UA 5.5     Protein, UA POC delfina     Urobilinogen, UA 0.2     Leukocytes, UA neg     Nitrite, UA neg        Physical Exam  Vitals:    10/25/22 0758   BP: 138/88   Pulse: 83   SpO2: 97%   Weight: 275 lb (124.7 kg)   Height: 6' 2\" (1.88 m)     Constitutional: Not in distress. Urologic Exam  CT reviewed with patient  Additional findings  None  Remainder the physical exam is normal  Assessment/Medical Necessity-Decision Making  Incidental nephrolith noted right lower pole 1 mm in size no evidence of hydronephrosis  Plan  No further work-up required as needed  Greater than 50% of 30 minutes spent consulting patient face-to-face  Orders Placed This Encounter   Procedures    POCT Urinalysis No Micro (Auto)     No orders of the defined types were placed in this encounter. Mirtha Mason MD       Please note this report has been partially produced using speech recognition software  And may cause contain errors related to that system including grammar, punctuation and spelling as well as words and phrases that may seem inappropriate. If there are questions or concerns please feel free to contact me to clarify.

## 2022-11-24 ENCOUNTER — HOSPITAL ENCOUNTER (EMERGENCY)
Age: 42
Discharge: HOME | End: 2022-11-24
Payer: COMMERCIAL

## 2022-11-24 VITALS
TEMPERATURE: 99.8 F | OXYGEN SATURATION: 98 % | HEART RATE: 106 BPM | DIASTOLIC BLOOD PRESSURE: 52 MMHG | SYSTOLIC BLOOD PRESSURE: 150 MMHG

## 2022-11-24 DIAGNOSIS — H10.33 ACUTE BACTERIAL CONJUNCTIVITIS OF BOTH EYES: Primary | ICD-10-CM

## 2022-11-24 PROCEDURE — 99283 EMERGENCY DEPT VISIT LOW MDM: CPT

## 2022-11-24 RX ORDER — CIPROFLOXACIN HYDROCHLORIDE 3.5 MG/ML
SOLUTION/ DROPS TOPICAL
Qty: 1 EACH | Refills: 0 | Status: SHIPPED | OUTPATIENT
Start: 2022-11-24 | End: 2022-12-04

## 2022-11-24 ASSESSMENT — ENCOUNTER SYMPTOMS
EYE ITCHING: 0
EYE REDNESS: 1
GASTROINTESTINAL NEGATIVE: 1
EYE PAIN: 0
PHOTOPHOBIA: 0
EYE DISCHARGE: 1
RESPIRATORY NEGATIVE: 1

## 2022-11-24 ASSESSMENT — PAIN DESCRIPTION - LOCATION: LOCATION: EYE

## 2022-11-24 ASSESSMENT — PAIN - FUNCTIONAL ASSESSMENT: PAIN_FUNCTIONAL_ASSESSMENT: 0-10

## 2022-11-24 ASSESSMENT — PAIN DESCRIPTION - ORIENTATION: ORIENTATION: RIGHT

## 2022-11-24 ASSESSMENT — PAIN SCALES - GENERAL: PAINLEVEL_OUTOF10: 4

## 2022-11-24 ASSESSMENT — PAIN DESCRIPTION - PAIN TYPE: TYPE: ACUTE PAIN

## 2022-11-24 NOTE — ED PROVIDER NOTES
[address] [facility] ED  EMERGENCY DEPARTMENT ENCOUNTER      Pt Name: Mariia Gaspar  MRN: [id number]  [place] 1980  Date of evaluation: 11/24/2022  Provider: DEIDRA Corona CNP    CHIEF COMPLAINT       Chief Complaint   Patient presents with    Eye Drainage     Right eye, since last night         HISTORY OF PRESENT ILLNESS   (Location/Symptom, Timing/Onset, Context/Setting, Quality, Duration, Modifying Factors, Severity)  Note limiting factors. Mariia Gaspar is a 38 y.o. male who presents to the emergency department      68-year-old male comes ER with complaints of redness and drainage and irritation of his right eye. Patient states that started yesterday. Denies any fever or chills. He denies any cough or congestion. Denies headache or blurred vision. Nursing Notes were reviewed. REVIEW OF SYSTEMS    (2-9 systems for level 4, 10 or more for level 5)     Review of Systems   Constitutional: Negative. HENT: Negative. Negative for congestion. Eyes:  Positive for discharge and redness. Negative for photophobia, pain, itching and visual disturbance. Respiratory: Negative. Cardiovascular: Negative. Gastrointestinal: Negative. Musculoskeletal: Negative. Neurological: Negative. Psychiatric/Behavioral: Negative. Except as noted above the remainder of the review of systems was reviewed and negative.        PAST MEDICAL HISTORY     Past Medical History:   Diagnosis Date    Depression     Diabetes mellitus ([address]     Hyperlipidemia     Hypertension          SURGICAL HISTORY       Past Surgical History:   Procedure Laterality Date    COLONOSCOPY  6/12/15    w/polypectomy     UPPER GASTROINTESTINAL ENDOSCOPY  6/12/15    w/bx,dilation          CURRENT MEDICATIONS       Previous Medications    ALLOPURINOL PO    Take by mouth    ATORVASTATIN (LIPITOR) 80 MG TABLET    Take 1 tablet by mouth nightly    ETODOLAC (LODINE) 400 MG TABLET    Take 1 tablet by mouth 2 times daily    FENOFIBRATE (TRICOR) 145 MG TABLET    Take 1 tablet by mouth daily    FENOFIBRATE (TRICOR) 145 MG TABLET    Take 1 tablet by mouth daily for 14 days    INSULIN NPH (NOVOLIN N) 100 UNIT/ML INJECTION VIAL    60 UNITS AT BEDTIME    INSULIN NPH (NOVOLIN N) 100 UNIT/ML INJECTION VIAL    60 UNITS AT BEDTIME    INSULIN REGULAR (NOVOLIN R) 100 UNIT/ML INJECTION    15 UNITS AT [facility] MEALS    INSULIN REGULAR (NOVOLIN R) 100 UNIT/ML INJECTION    15 UNITS TID    INSULIN SYRINGE-NEEDLE U-100 ([employer] INSULIN SYRINGE) 31G X /16\" 1 ML MISC    QID    LISINOPRIL-HYDROCHLOROTHIAZIDE (PRINZIDE;ZESTORETIC) 20-12.5 MG PER TABLET    Take 1 tablet by mouth daily    OMEGA-3 ACID ETHYL ESTERS (LOVAZA) 1 G CAPSULE    Take 2 capsules by mouth 2 times daily       ALLERGIES     Patient has no known allergies. FAMILY HISTORY       Family History   Problem Relation Age of Onset    High Blood Pressure Father           SOCIAL HISTORY       Social History     Socioeconomic History    Marital status: Single     Spouse name: None    Number of children: None    Years of education: None    Highest education level: None   Tobacco Use    Smoking status: Every Day     Packs/day: 0.50     Types: Cigarettes     Last attempt to quit: 2014     Years since quittin.2    Smokeless tobacco: Never   Vaping Use    Vaping Use: Never used   Substance and Sexual Activity    Alcohol use: Yes     Comment: weekends    Drug use: No       SCREENINGS    [place] Coma Scale  Eye Opening: Spontaneous  Best Verbal Response: Oriented  Best Motor Response: Obeys commands  [place] Coma Scale Score: 15          PHYSICAL EXAM    (up to 7 for level 4, 8 or more for level 5)     ED Triage Vitals [22 1734]   BP Temp Temp Source Heart Rate Resp SpO2 Height Weight   (!) 150/52 99.8 °F (37.7 °C) Temporal (!) 106 -- 98 % -- --       Physical Exam  Vitals and nursing note reviewed. Constitutional:       Appearance: Normal appearance.    HENT: Head: Normocephalic. Right Ear: Tympanic membrane, ear canal and external ear normal.      Left Ear: Tympanic membrane, ear canal and external ear normal.      Nose: Nose normal.      Mouth/Throat:      Mouth: Mucous membranes are moist.   Eyes:      General: No scleral icterus. Right eye: Discharge present. Left eye: Discharge present. Pupils: Pupils are equal, round, and reactive to light. Cardiovascular:      Rate and Rhythm: Normal rate and regular rhythm. Pulmonary:      Effort: Pulmonary effort is normal.      Breath sounds: Normal breath sounds. Musculoskeletal:      Cervical back: Normal range of motion. Skin:     General: Skin is warm and dry. Neurological:      General: No focal deficit present. Mental Status: He is alert and oriented to person, place, and time. Psychiatric:         Mood and Affect: Mood normal.         Behavior: Behavior normal.       DIAGNOSTIC RESULTS     EKG: All EKG's are interpreted by the Emergency Department Physician who either signs or Co-signs this chart in the absence of a cardiologist.        RADIOLOGY:   Non-plain film images such as CT, Ultrasound and MRI are read by the radiologist. Plain radiographic images are visualized and preliminarily interpreted by the emergency physician with the below findings:        Interpretation per the Radiologist below, if available at the time of this note:    No orders to display         ED BEDSIDE ULTRASOUND:   Performed by ED Physician - none    LABS:  Labs Reviewed - No data to display    All other labs were within normal range or not returned as of this dictation. EMERGENCY DEPARTMENT COURSE and DIFFERENTIAL DIAGNOSIS/MDM:   Vitals:    Vitals:    11/24/22 1734   BP: (!) 150/52   Pulse: (!) 106   Temp: 99.8 °F (37.7 °C)   TempSrc: Temporal   SpO2: 98%           MDM        REASSESSMENT            PROCEDURES:  Unless otherwise noted below, none     Procedures        FINAL IMPRESSION      1.  Acute bacterial conjunctivitis of both eyes          DISPOSITION/PLAN   DISPOSITION Decision To Discharge 11/24/2022 05:43:12 PM      PATIENT REFERRED TO:  Mike Wiggins DO  [address] [place] [address]  [phone]      As needed    DISCHARGE MEDICATIONS:  New Prescriptions    CIPROFLOXACIN (CILOXAN) 0.3 % OPHTHALMIC SOLUTION    One drop to the affected eye every 2 hours while awake on day 1 then 4 times a day for the next 9 days, 10 days total treatment. Controlled Substances Monitoring:     No flowsheet data found.     (Please note that portions of this note were completed with a voice recognition program.  Efforts were made to edit the dictations but occasionally words are mis-transcribed.)    DEIDRA Todd CNP (electronically signed)  Attending Emergency Physician          DEIDRA Govea CNP  11/24/22 3206

## 2022-11-24 NOTE — DISCHARGE INSTRUCTIONS
The eyedrops as prescribed. Wash your bedding every night for the next couple of nights. Off work tomorrow. Follow-up with your family physician if no improvement in 2 to 3 days.

## 2022-11-24 NOTE — Clinical Note
Katia Lama was seen and treated in our emergency department on 11/24/2022. He may return to work on 11/28/2022. If you have any questions or concerns, please don't hesitate to call.       Christi Kan, APRN - CNP

## 2022-11-24 NOTE — ED TRIAGE NOTES
To ED with right eye irritation and drainage. States he had an eyelash in it last night, was able to get it out. Redness, irritation and drainage have persisted today. No changes in vision.

## 2023-03-20 DIAGNOSIS — R53.83 OTHER FATIGUE: Primary | ICD-10-CM

## 2023-03-21 RX ORDER — OMEGA-3-ACID ETHYL ESTERS 1 G/1
CAPSULE, LIQUID FILLED ORAL
Qty: 90 CAPSULE | Refills: 1 | Status: SHIPPED | OUTPATIENT
Start: 2023-03-21 | End: 2023-11-15

## 2023-03-28 DIAGNOSIS — E78.2 MIXED HYPERLIPIDEMIA: Primary | ICD-10-CM

## 2023-03-28 RX ORDER — FENOFIBRATE 145 MG/1
TABLET, FILM COATED ORAL
Qty: 30 TABLET | Refills: 5 | Status: SHIPPED | OUTPATIENT
Start: 2023-03-28 | End: 2023-11-15

## 2023-04-02 DIAGNOSIS — E78.5 DM TYPE 2 WITH DIABETIC DYSLIPIDEMIA (MULTI): ICD-10-CM

## 2023-04-02 DIAGNOSIS — E11.69 DM TYPE 2 WITH DIABETIC DYSLIPIDEMIA (MULTI): ICD-10-CM

## 2023-04-03 PROBLEM — E11.69 DM TYPE 2 WITH DIABETIC DYSLIPIDEMIA (MULTI): Status: ACTIVE | Noted: 2023-04-03

## 2023-04-03 PROBLEM — E78.5 DM TYPE 2 WITH DIABETIC DYSLIPIDEMIA (MULTI): Status: ACTIVE | Noted: 2023-04-03

## 2023-04-03 RX ORDER — INSULIN LISPRO 100 [IU]/ML
INJECTION, SOLUTION INTRAVENOUS; SUBCUTANEOUS
Qty: 15 ML | Refills: 5 | Status: SHIPPED | OUTPATIENT
Start: 2023-04-03 | End: 2023-06-26

## 2023-04-28 DIAGNOSIS — M10.9 GOUT, UNSPECIFIED CAUSE, UNSPECIFIED CHRONICITY, UNSPECIFIED SITE: Primary | ICD-10-CM

## 2023-04-28 RX ORDER — ALLOPURINOL 100 MG/1
TABLET ORAL
Qty: 180 TABLET | Refills: 1 | Status: SHIPPED | OUTPATIENT
Start: 2023-04-28 | End: 2024-04-03

## 2023-05-09 ENCOUNTER — HOSPITAL ENCOUNTER (EMERGENCY)
Age: 43
Discharge: HOME | End: 2023-05-09
Attending: EMERGENCY MEDICINE
Payer: COMMERCIAL

## 2023-05-09 VITALS
RESPIRATION RATE: 20 BRPM | WEIGHT: 275 LBS | OXYGEN SATURATION: 98 % | HEART RATE: 91 BPM | BODY MASS INDEX: 35.29 KG/M2 | TEMPERATURE: 98.2 F | DIASTOLIC BLOOD PRESSURE: 92 MMHG | SYSTOLIC BLOOD PRESSURE: 140 MMHG | HEIGHT: 74 IN

## 2023-05-09 DIAGNOSIS — M10.162 LEAD-INDUCED ACUTE GOUT OF LEFT KNEE, INITIAL ENCOUNTER: Primary | ICD-10-CM

## 2023-05-09 DIAGNOSIS — T56.0X1A LEAD-INDUCED ACUTE GOUT OF LEFT KNEE, INITIAL ENCOUNTER: Primary | ICD-10-CM

## 2023-05-09 PROCEDURE — 99284 EMERGENCY DEPT VISIT MOD MDM: CPT

## 2023-05-09 PROCEDURE — 6360000002 HC RX W HCPCS: Performed by: EMERGENCY MEDICINE

## 2023-05-09 PROCEDURE — 96372 THER/PROPH/DIAG INJ SC/IM: CPT

## 2023-05-09 PROCEDURE — 6370000000 HC RX 637 (ALT 250 FOR IP): Performed by: EMERGENCY MEDICINE

## 2023-05-09 RX ORDER — NAPROXEN 500 MG/1
500 TABLET ORAL 2 TIMES DAILY WITH MEALS
Qty: 60 TABLET | Refills: 5 | Status: SHIPPED | OUTPATIENT
Start: 2023-05-09

## 2023-05-09 RX ORDER — PREDNISONE 20 MG/1
TABLET ORAL
Qty: 13 TABLET | Refills: 0 | Status: SHIPPED | OUTPATIENT
Start: 2023-05-09 | End: 2023-05-18

## 2023-05-09 RX ORDER — KETOROLAC TROMETHAMINE 30 MG/ML
30 INJECTION, SOLUTION INTRAMUSCULAR; INTRAVENOUS ONCE
Status: COMPLETED | OUTPATIENT
Start: 2023-05-09 | End: 2023-05-09

## 2023-05-09 RX ORDER — PREDNISONE 20 MG/1
60 TABLET ORAL ONCE
Status: COMPLETED | OUTPATIENT
Start: 2023-05-09 | End: 2023-05-09

## 2023-05-09 RX ADMIN — KETOROLAC TROMETHAMINE 30 MG: 30 INJECTION, SOLUTION INTRAMUSCULAR; INTRAVENOUS at 14:16

## 2023-05-09 RX ADMIN — PREDNISONE 60 MG: 20 TABLET ORAL at 14:15

## 2023-05-09 ASSESSMENT — PAIN DESCRIPTION - FREQUENCY: FREQUENCY: INTERMITTENT

## 2023-05-09 ASSESSMENT — PAIN DESCRIPTION - ORIENTATION: ORIENTATION: LEFT

## 2023-05-09 ASSESSMENT — ENCOUNTER SYMPTOMS
ALLERGIC/IMMUNOLOGIC NEGATIVE: 1
RESPIRATORY NEGATIVE: 1
GASTROINTESTINAL NEGATIVE: 1
EYES NEGATIVE: 1

## 2023-05-09 ASSESSMENT — PAIN SCALES - GENERAL: PAINLEVEL_OUTOF10: 8

## 2023-05-09 ASSESSMENT — PAIN - FUNCTIONAL ASSESSMENT: PAIN_FUNCTIONAL_ASSESSMENT: 0-10

## 2023-05-09 ASSESSMENT — PAIN DESCRIPTION - DESCRIPTORS: DESCRIPTORS: ACHING

## 2023-05-09 ASSESSMENT — PAIN DESCRIPTION - LOCATION: LOCATION: KNEE

## 2023-05-09 NOTE — ED TRIAGE NOTES
Pt to ed from home via triage with c/o pain to left knee  Pt reports onset of pain yesterday  Pt reports history of gout, also states that he is a  and works on his knees  Pt denies injury or trauma  On arrival pt skin WDI, respirations even and unlabored   Pt calm and cooperative, alert and oriented. No s/s of acute distress noted.

## 2023-05-09 NOTE — ED PROVIDER NOTES
Decision To Discharge 05/09/2023 02:16:49 PM      PATIENT REFERRED TO:  ALICIA Welch [id number] [phone]      As needed      DISCHARGE MEDICATIONS:  New Prescriptions    NAPROXEN (NAPROSYN) 500 MG TABLET    Take 1 tablet by mouth 2 times daily (with meals)    PREDNISONE (DELTASONE) 20 MG TABLET    Take 3 tablets by mouth daily for 1 day, THEN 2 tablets daily for 2 days, THEN 1.5 tablets daily for 2 days, THEN 1 tablet daily for 2 days, THEN 0.5 tablets daily for 2 days. Controlled Substances Monitoring:     No flowsheet data found.     (Please note that portions of this note were completed with a voice recognition program.  Efforts were made to edit the dictations but occasionally words are mis-transcribed.)    Florance Cushing, MD (electronically signed)  Attending Emergency Physician            Florance Cushing, MD  05/09/23 1845

## 2023-05-19 ENCOUNTER — TELEPHONE (OUTPATIENT)
Dept: PRIMARY CARE | Facility: CLINIC | Age: 43
End: 2023-05-19
Payer: COMMERCIAL

## 2023-05-19 DIAGNOSIS — E29.1 TESTICULAR HYPOFUNCTION: ICD-10-CM

## 2023-05-19 DIAGNOSIS — E78.5 DM TYPE 2 WITH DIABETIC DYSLIPIDEMIA (MULTI): ICD-10-CM

## 2023-05-19 DIAGNOSIS — E11.69 DM TYPE 2 WITH DIABETIC DYSLIPIDEMIA (MULTI): ICD-10-CM

## 2023-05-19 DIAGNOSIS — E78.2 MIXED HYPERLIPIDEMIA: ICD-10-CM

## 2023-05-19 NOTE — TELEPHONE ENCOUNTER
Pt has appointment on 6/6/23, would like orders for blood work sent in before appointment.  Would also like testosterone levels checked also.  Please advise pt

## 2023-06-06 ENCOUNTER — APPOINTMENT (OUTPATIENT)
Dept: PRIMARY CARE | Facility: CLINIC | Age: 43
End: 2023-06-06
Payer: COMMERCIAL

## 2023-06-15 ENCOUNTER — LAB (OUTPATIENT)
Dept: LAB | Facility: LAB | Age: 43
End: 2023-06-15
Payer: COMMERCIAL

## 2023-06-15 DIAGNOSIS — E29.1 TESTICULAR HYPOFUNCTION: ICD-10-CM

## 2023-06-15 DIAGNOSIS — E11.69 DM TYPE 2 WITH DIABETIC DYSLIPIDEMIA (MULTI): ICD-10-CM

## 2023-06-15 DIAGNOSIS — E78.2 MIXED HYPERLIPIDEMIA: ICD-10-CM

## 2023-06-15 DIAGNOSIS — E78.5 DM TYPE 2 WITH DIABETIC DYSLIPIDEMIA (MULTI): ICD-10-CM

## 2023-06-15 LAB
ALANINE AMINOTRANSFERASE (SGPT) (U/L) IN SER/PLAS: 44 U/L (ref 10–52)
ALBUMIN (G/DL) IN SER/PLAS: 4.1 G/DL (ref 3.4–5)
ALKALINE PHOSPHATASE (U/L) IN SER/PLAS: 119 U/L (ref 33–120)
ANION GAP IN SER/PLAS: 15 MMOL/L (ref 10–20)
ASPARTATE AMINOTRANSFERASE (SGOT) (U/L) IN SER/PLAS: 37 U/L (ref 9–39)
BASOPHILS (10*3/UL) IN BLOOD BY AUTOMATED COUNT: 0.02 X10E9/L (ref 0–0.1)
BASOPHILS/100 LEUKOCYTES IN BLOOD BY AUTOMATED COUNT: 0.3 % (ref 0–2)
BILIRUBIN TOTAL (MG/DL) IN SER/PLAS: 0.8 MG/DL (ref 0–1.2)
CALCIUM (MG/DL) IN SER/PLAS: 9.3 MG/DL (ref 8.6–10.3)
CARBON DIOXIDE, TOTAL (MMOL/L) IN SER/PLAS: 24 MMOL/L (ref 21–32)
CHLORIDE (MMOL/L) IN SER/PLAS: 101 MMOL/L (ref 98–107)
CHOLESTEROL (MG/DL) IN SER/PLAS: 291 MG/DL (ref 0–199)
CHOLESTEROL IN HDL (MG/DL) IN SER/PLAS: 22.8 MG/DL
CHOLESTEROL/HDL RATIO: 12.8
CREATININE (MG/DL) IN SER/PLAS: 1.05 MG/DL (ref 0.5–1.3)
EOSINOPHILS (10*3/UL) IN BLOOD BY AUTOMATED COUNT: 0.15 X10E9/L (ref 0–0.7)
EOSINOPHILS/100 LEUKOCYTES IN BLOOD BY AUTOMATED COUNT: 2.4 % (ref 0–6)
ERYTHROCYTE DISTRIBUTION WIDTH (RATIO) BY AUTOMATED COUNT: 12.9 % (ref 11.5–14.5)
ERYTHROCYTE MEAN CORPUSCULAR HEMOGLOBIN CONCENTRATION (G/DL) BY AUTOMATED: 32.2 G/DL (ref 32–36)
ERYTHROCYTE MEAN CORPUSCULAR VOLUME (FL) BY AUTOMATED COUNT: 92 FL (ref 80–100)
ERYTHROCYTES (10*6/UL) IN BLOOD BY AUTOMATED COUNT: 4.91 X10E12/L (ref 4.5–5.9)
GFR MALE: >90 ML/MIN/1.73M2
GLUCOSE (MG/DL) IN SER/PLAS: 149 MG/DL (ref 74–99)
HEMATOCRIT (%) IN BLOOD BY AUTOMATED COUNT: 45 % (ref 41–52)
HEMOGLOBIN (G/DL) IN BLOOD: 14.5 G/DL (ref 13.5–17.5)
IMMATURE GRANULOCYTES/100 LEUKOCYTES IN BLOOD BY AUTOMATED COUNT: 0.3 % (ref 0–0.9)
LDL: ABNORMAL MG/DL (ref 0–99)
LEUKOCYTES (10*3/UL) IN BLOOD BY AUTOMATED COUNT: 6.2 X10E9/L (ref 4.4–11.3)
LYMPHOCYTES (10*3/UL) IN BLOOD BY AUTOMATED COUNT: 1.29 X10E9/L (ref 1.2–4.8)
LYMPHOCYTES/100 LEUKOCYTES IN BLOOD BY AUTOMATED COUNT: 20.9 % (ref 13–44)
MONOCYTES (10*3/UL) IN BLOOD BY AUTOMATED COUNT: 0.45 X10E9/L (ref 0.1–1)
MONOCYTES/100 LEUKOCYTES IN BLOOD BY AUTOMATED COUNT: 7.3 % (ref 2–10)
NEUTROPHILS (10*3/UL) IN BLOOD BY AUTOMATED COUNT: 4.23 X10E9/L (ref 1.2–7.7)
NEUTROPHILS/100 LEUKOCYTES IN BLOOD BY AUTOMATED COUNT: 68.8 % (ref 40–80)
PLATELETS (10*3/UL) IN BLOOD AUTOMATED COUNT: 272 X10E9/L (ref 150–450)
POTASSIUM (MMOL/L) IN SER/PLAS: 3.8 MMOL/L (ref 3.5–5.3)
PROTEIN TOTAL: 7.8 G/DL (ref 6.4–8.2)
SODIUM (MMOL/L) IN SER/PLAS: 136 MMOL/L (ref 136–145)
TESTOSTERONE (NG/DL) IN SER/PLAS: 221 NG/DL (ref 240–1000)
TRIGLYCERIDE (MG/DL) IN SER/PLAS: 2444 MG/DL (ref 0–149)
UREA NITROGEN (MG/DL) IN SER/PLAS: 10 MG/DL (ref 6–23)
VLDL: ABNORMAL MG/DL (ref 0–40)

## 2023-06-15 PROCEDURE — 83036 HEMOGLOBIN GLYCOSYLATED A1C: CPT

## 2023-06-15 PROCEDURE — 84403 ASSAY OF TOTAL TESTOSTERONE: CPT

## 2023-06-15 PROCEDURE — 80061 LIPID PANEL: CPT

## 2023-06-15 PROCEDURE — 80053 COMPREHEN METABOLIC PANEL: CPT

## 2023-06-15 PROCEDURE — 36415 COLL VENOUS BLD VENIPUNCTURE: CPT

## 2023-06-15 PROCEDURE — 85025 COMPLETE CBC W/AUTO DIFF WBC: CPT

## 2023-06-15 PROCEDURE — 83721 ASSAY OF BLOOD LIPOPROTEIN: CPT

## 2023-06-16 LAB
CHOLESTEROL IN LDL (MG/DL) IN SER/PLAS BY DIRECT ASSAY: 52 MG/DL (ref 0–129)
ESTIMATED AVERAGE GLUCOSE FOR HBA1C: 180 MG/DL
HEMOGLOBIN A1C/HEMOGLOBIN TOTAL IN BLOOD: 7.9 %

## 2023-06-19 ENCOUNTER — OFFICE VISIT (OUTPATIENT)
Dept: PRIMARY CARE | Facility: CLINIC | Age: 43
End: 2023-06-19
Payer: COMMERCIAL

## 2023-06-19 VITALS
OXYGEN SATURATION: 98 % | HEIGHT: 74 IN | SYSTOLIC BLOOD PRESSURE: 132 MMHG | HEART RATE: 75 BPM | RESPIRATION RATE: 16 BRPM | DIASTOLIC BLOOD PRESSURE: 88 MMHG | BODY MASS INDEX: 34.16 KG/M2 | WEIGHT: 266.2 LBS

## 2023-06-19 DIAGNOSIS — E78.2 MIXED HYPERLIPIDEMIA: ICD-10-CM

## 2023-06-19 DIAGNOSIS — E11.69 DM TYPE 2 WITH DIABETIC DYSLIPIDEMIA (MULTI): ICD-10-CM

## 2023-06-19 DIAGNOSIS — J30.9 ALLERGIC RHINITIS, UNSPECIFIED SEASONALITY, UNSPECIFIED TRIGGER: ICD-10-CM

## 2023-06-19 DIAGNOSIS — E34.9 TESTOSTERONE DEFICIENCY: ICD-10-CM

## 2023-06-19 DIAGNOSIS — F32.A DEPRESSION, UNSPECIFIED DEPRESSION TYPE: ICD-10-CM

## 2023-06-19 DIAGNOSIS — E78.5 DM TYPE 2 WITH DIABETIC DYSLIPIDEMIA (MULTI): ICD-10-CM

## 2023-06-19 PROCEDURE — 99214 OFFICE O/P EST MOD 30 MIN: CPT | Performed by: FAMILY MEDICINE

## 2023-06-19 RX ORDER — MONTELUKAST SODIUM 10 MG/1
10 TABLET ORAL NIGHTLY
Qty: 30 TABLET | Refills: 5 | Status: SHIPPED
Start: 2023-06-19 | End: 2024-01-16 | Stop reason: WASHOUT

## 2023-06-19 RX ORDER — EMPAGLIFLOZIN 10 MG/1
10 TABLET, FILM COATED ORAL
COMMUNITY
Start: 2021-10-22 | End: 2023-06-26

## 2023-06-19 RX ORDER — EZETIMIBE 10 MG/1
1 TABLET ORAL DAILY
COMMUNITY
Start: 2021-11-23 | End: 2023-06-19 | Stop reason: ALTCHOICE

## 2023-06-19 RX ORDER — TESTOSTERONE CYPIONATE 200 MG/ML
200 INJECTION, SOLUTION INTRAMUSCULAR
Qty: 3 ML | Refills: 0 | Status: SHIPPED | OUTPATIENT
Start: 2023-06-19 | End: 2024-01-16 | Stop reason: WASHOUT

## 2023-06-19 RX ORDER — INSULIN GLARGINE 100 [IU]/ML
INJECTION, SOLUTION SUBCUTANEOUS
COMMUNITY
Start: 2022-03-01 | End: 2024-01-16 | Stop reason: WASHOUT

## 2023-06-19 RX ORDER — TIRZEPATIDE 2.5 MG/.5ML
2.5 INJECTION, SOLUTION SUBCUTANEOUS
Qty: 2 ML | Refills: 0 | Status: SHIPPED | OUTPATIENT
Start: 2023-06-19 | End: 2023-07-22

## 2023-06-19 RX ORDER — CETIRIZINE HYDROCHLORIDE 10 MG/1
1 TABLET ORAL DAILY
COMMUNITY
Start: 2019-12-27

## 2023-06-19 RX ORDER — COLCHICINE 0.6 MG/1
0.6 TABLET ORAL DAILY
COMMUNITY
Start: 2021-06-25 | End: 2023-10-21

## 2023-06-19 RX ORDER — BUPROPION HYDROCHLORIDE 150 MG/1
150 TABLET, EXTENDED RELEASE ORAL DAILY
Qty: 30 TABLET | Refills: 1 | Status: SHIPPED
Start: 2023-06-19 | End: 2024-01-16 | Stop reason: WASHOUT

## 2023-06-19 RX ORDER — EVOLOCUMAB 140 MG/ML
140 INJECTION, SOLUTION SUBCUTANEOUS
Qty: 2 ML | Refills: 2 | Status: SHIPPED
Start: 2023-06-19 | End: 2024-01-16 | Stop reason: WASHOUT

## 2023-06-19 RX ORDER — LISINOPRIL AND HYDROCHLOROTHIAZIDE 20; 25 MG/1; MG/1
1 TABLET ORAL DAILY
COMMUNITY
Start: 2021-10-06 | End: 2023-07-21 | Stop reason: ALTCHOICE

## 2023-06-19 RX ORDER — ATORVASTATIN CALCIUM 40 MG/1
40 TABLET, FILM COATED ORAL DAILY
Qty: 30 TABLET | Refills: 5 | Status: SHIPPED | OUTPATIENT
Start: 2023-06-19 | End: 2024-05-16 | Stop reason: SDUPTHER

## 2023-06-19 RX ORDER — ATORVASTATIN CALCIUM 20 MG/1
20 TABLET, FILM COATED ORAL DAILY
COMMUNITY
Start: 2021-10-06 | End: 2023-06-19 | Stop reason: DRUGHIGH

## 2023-06-19 ASSESSMENT — ENCOUNTER SYMPTOMS
NERVOUS/ANXIOUS: 0
COLOR CHANGE: 0
PALPITATIONS: 0
EYE PAIN: 0
RHINORRHEA: 0
APPETITE CHANGE: 0
RECTAL PAIN: 0
OCCASIONAL FEELINGS OF UNSTEADINESS: 0
AGITATION: 0
COUGH: 0
ARTHRALGIAS: 0
FEVER: 0
CHEST TIGHTNESS: 0
HEMATURIA: 0
TROUBLE SWALLOWING: 0
SEIZURES: 0
CONSTIPATION: 0
DEPRESSION: 0
SHORTNESS OF BREATH: 0
ABDOMINAL DISTENTION: 0
CONSTITUTIONAL NEGATIVE: 1
HEADACHES: 0
SINUS PRESSURE: 0
DIARRHEA: 0
ADENOPATHY: 0
ABDOMINAL PAIN: 0
SLEEP DISTURBANCE: 0
FATIGUE: 0
BLOOD IN STOOL: 0
CONFUSION: 0
POLYPHAGIA: 0
FLANK PAIN: 0
NECK STIFFNESS: 0
SINUS PAIN: 0
DECREASED CONCENTRATION: 0
ACTIVITY CHANGE: 0
STRIDOR: 0
SPEECH DIFFICULTY: 0
POLYDIPSIA: 0
MYALGIAS: 0
SORE THROAT: 0
PHOTOPHOBIA: 0
DYSPHORIC MOOD: 0
DIZZINESS: 0
LOSS OF SENSATION IN FEET: 0
DYSURIA: 0

## 2023-06-19 ASSESSMENT — PAIN SCALES - GENERAL: PAINLEVEL: 0-NO PAIN

## 2023-06-19 NOTE — PATIENT INSTRUCTIONS
Follow up in 1 month    Continue current medications and therapy for chronic medical conditions.    Patient was advised importance of proper diet/nutrition in addition adequate hydration. Patient was encouraged moderate exercise program to include 30 minutes daily for 5 days of the week or 150 minutes weekly. Patient will follow-up with us as scheduled.    Labs from 6/15/2023 reviewed with patient     DC Zetia     Lipitor increased to 40mg daily     Testosterone level ordered     Start Testosterone injection 200mg every 28 days     Start mounjaro 2.5mg weekly     Start Repatha 140 every 2 weeks     Start Singulair 10mg daily     Start wellbutrin 150 SR once daily     Consider PFT next       Ways to Help Prevent Falls at Home    Quick Tips   ? Ask for help if you need it. Most people want to help!   ? Get up slowly after sitting or laying down   ? Wear a medical alert device or keep cell phone in your pocket   ? Use night lights, especially areas near a bathroom   ? Keep the items you use often within reach on a small stool or end table   ? Use an assistive device such as walker or cane, as directed by provider/physical therapy   ? Use a non-slip mat and grab bars in your bathroom. Look for home health sections for best options     Other Areas to Focus On   ? Exercise and nutrition: Regular exercise or taking a falls prevention class are great ways improve strength and balance. Don’t forget to stay hydrated and bring a snack!   ? Medicine side effects: Some medicines can make you sleepy or dizzy, which could cause a fall. Ask your healthcare provider about the side effects your medicines could cause. Be sure to let them know if you take any vitamins or supplements as well.   ? Tripping hazards: Remove items you could trip on, such as loose mats, rugs, cords, and clutter. Wear closed toe shoes with rubber soles.   ? Health and wellness: Get regular checkups with your healthcare provider, plus routine vision and  hearing screenings. Talk with your healthcare provider about:   o Your medicines and the possible side effects - bring them in a bag if that is easier!   o Problems with balance or feeling dizzy   o Ways to promote bone health, such as Vitamin D and calcium supplements   o Questions or concerns about falling     *Ask your healthcare team if you have questions     ©[facility], 2022

## 2023-06-19 NOTE — PROGRESS NOTES
"Subjective   Patient ID: Jacky Richardson is a 43 y.o. male who presents for Results.    HPI Patient states follow up on lab work. Patient states noticed testosterone level low on blood work.  Patient states lower back feels tight    Review of Systems   Constitutional: Negative.  Negative for activity change, appetite change, fatigue and fever.   HENT:  Negative for congestion, dental problem, ear discharge, ear pain, mouth sores, rhinorrhea, sinus pressure, sinus pain, sore throat, tinnitus and trouble swallowing.    Eyes:  Negative for photophobia, pain and visual disturbance.   Respiratory:  Negative for cough, chest tightness, shortness of breath and stridor.    Cardiovascular:  Negative for chest pain and palpitations.   Gastrointestinal:  Negative for abdominal distention, abdominal pain, blood in stool, constipation, diarrhea and rectal pain.   Endocrine: Negative for cold intolerance, heat intolerance, polydipsia, polyphagia and polyuria.   Genitourinary:  Negative for dysuria, flank pain, hematuria and urgency.   Musculoskeletal:  Negative for arthralgias, gait problem, myalgias and neck stiffness.   Skin:  Negative for color change and rash.   Allergic/Immunologic: Negative for environmental allergies and food allergies.   Neurological:  Negative for dizziness, seizures, syncope, speech difficulty and headaches.   Hematological:  Negative for adenopathy.   Psychiatric/Behavioral:  Negative for agitation, confusion, decreased concentration, dysphoric mood and sleep disturbance. The patient is not nervous/anxious.        Objective   /88 (BP Location: Right arm, Patient Position: Sitting)   Pulse 75   Resp 16   Ht 1.88 m (6' 2\")   Wt 121 kg (266 lb 3.2 oz)   SpO2 98%   BMI 34.18 kg/m²     Physical Exam  Vitals reviewed.   Constitutional:       General: He is not in acute distress.     Appearance: Normal appearance. He is obese. He is not ill-appearing or diaphoretic.   HENT:      Head: Normocephalic. "      Right Ear: Tympanic membrane and external ear normal.      Left Ear: Tympanic membrane and external ear normal.      Nose: Nose normal. No congestion.      Mouth/Throat:      Mouth: Mucous membranes are dry.      Pharynx: No posterior oropharyngeal erythema.   Eyes:      General:         Right eye: No discharge.         Left eye: No discharge.      Extraocular Movements: Extraocular movements intact.      Conjunctiva/sclera: Conjunctivae normal.      Pupils: Pupils are equal, round, and reactive to light.   Cardiovascular:      Rate and Rhythm: Normal rate and regular rhythm.      Pulses: Normal pulses.      Heart sounds: Normal heart sounds. No murmur heard.  Pulmonary:      Effort: Pulmonary effort is normal. No respiratory distress.      Breath sounds: Normal breath sounds. No wheezing or rales.   Chest:      Chest wall: No tenderness.   Abdominal:      General: Abdomen is flat. Bowel sounds are normal. There is distension.      Palpations: There is no mass.      Tenderness: There is no abdominal tenderness. There is no guarding.   Genitourinary:     Rectum: Normal.   Musculoskeletal:         General: No tenderness. Normal range of motion.      Cervical back: Normal range of motion and neck supple. No tenderness.      Right lower leg: No edema.      Left lower leg: No edema.   Skin:     General: Skin is warm and dry.      Coloration: Skin is not jaundiced.      Findings: No bruising or erythema.   Neurological:      General: No focal deficit present.      Mental Status: He is alert and oriented to person, place, and time. Mental status is at baseline.      Cranial Nerves: No cranial nerve deficit.      Sensory: No sensory deficit.      Coordination: Coordination normal.      Gait: Gait normal.   Psychiatric:         Mood and Affect: Mood normal.         Thought Content: Thought content normal.         Judgment: Judgment normal.         Assessment/Plan   Problem List Items Addressed This Visit           Endocrine/Metabolic    DM type 2 with diabetic dyslipidemia (CMS/[facility])    Relevant Medications    tirzepatide (Mounjaro) 2.5 mg/0.5 mL pen injector       Other    Mixed hyperlipidemia    Relevant Medications    atorvastatin (Lipitor) 40 mg tablet    evolocumab (Repatha SureClick) 140 mg/mL injection     Other Visit Diagnoses       Testosterone deficiency        Relevant Medications    testosterone cypionate (Depo-Testosterone) 200 mg/mL injection    Other Relevant Orders    Testosterone    Allergic rhinitis, unspecified seasonality, unspecified trigger        Relevant Medications    montelukast (Singulair) 10 mg tablet    Depression, unspecified depression type        Relevant Medications    buPROPion SR (Wellbutrin SR) 150 mg 12 hr tablet         Scribe Attestation  By signing my name below, I, Oxana WAGNER , Scribe   attest that this documentation has been prepared under the direction and in the presence of Yevgeniy Olmedo DO.  Provider Attestation - Scribe documentation  All medical record entries made by the Scribe were at my direction and personally dictated by me. I have reviewed the chart and agree that the record accurately reflects my personal performance of the history, physical exam, discussion and plan.       Patient was identified as a fall risk. Risk prevention instructions provided.

## 2023-06-24 DIAGNOSIS — E11.69 DM TYPE 2 WITH DIABETIC DYSLIPIDEMIA (MULTI): ICD-10-CM

## 2023-06-24 DIAGNOSIS — E78.5 DM TYPE 2 WITH DIABETIC DYSLIPIDEMIA (MULTI): ICD-10-CM

## 2023-06-26 RX ORDER — INSULIN LISPRO 100 [IU]/ML
INJECTION, SOLUTION INTRAVENOUS; SUBCUTANEOUS
Qty: 15 ML | Refills: 5 | Status: SHIPPED
Start: 2023-06-26 | End: 2024-01-24

## 2023-06-26 RX ORDER — EMPAGLIFLOZIN 10 MG/1
TABLET, FILM COATED ORAL
Qty: 30 TABLET | Refills: 3 | Status: SHIPPED
Start: 2023-06-26 | End: 2024-01-16 | Stop reason: WASHOUT

## 2023-07-21 DIAGNOSIS — E11.69 DM TYPE 2 WITH DIABETIC DYSLIPIDEMIA (MULTI): ICD-10-CM

## 2023-07-21 DIAGNOSIS — E78.5 DM TYPE 2 WITH DIABETIC DYSLIPIDEMIA (MULTI): ICD-10-CM

## 2023-07-21 RX ORDER — LISINOPRIL AND HYDROCHLOROTHIAZIDE 12.5; 2 MG/1; MG/1
1 TABLET ORAL DAILY
COMMUNITY
Start: 2015-06-23 | End: 2023-08-23

## 2023-07-22 RX ORDER — TIRZEPATIDE 2.5 MG/.5ML
INJECTION, SOLUTION SUBCUTANEOUS
Qty: 2 ML | Refills: 0 | Status: SHIPPED | OUTPATIENT
Start: 2023-07-22 | End: 2023-08-31

## 2023-08-18 DIAGNOSIS — I10 PRIMARY HYPERTENSION: ICD-10-CM

## 2023-08-23 RX ORDER — LISINOPRIL AND HYDROCHLOROTHIAZIDE 12.5; 2 MG/1; MG/1
1 TABLET ORAL DAILY
Qty: 30 TABLET | Refills: 5 | Status: SHIPPED | OUTPATIENT
Start: 2023-08-23 | End: 2024-05-16 | Stop reason: SDUPTHER

## 2023-08-28 DIAGNOSIS — E11.69 DM TYPE 2 WITH DIABETIC DYSLIPIDEMIA (MULTI): ICD-10-CM

## 2023-08-28 DIAGNOSIS — E78.5 DM TYPE 2 WITH DIABETIC DYSLIPIDEMIA (MULTI): ICD-10-CM

## 2023-08-31 RX ORDER — TIRZEPATIDE 2.5 MG/.5ML
INJECTION, SOLUTION SUBCUTANEOUS
Qty: 2 ML | Refills: 0 | Status: SHIPPED | OUTPATIENT
Start: 2023-08-31 | End: 2023-09-30

## 2023-09-06 ENCOUNTER — TELEPHONE (OUTPATIENT)
Dept: PRIMARY CARE | Facility: CLINIC | Age: 43
End: 2023-09-06
Payer: COMMERCIAL

## 2023-09-06 DIAGNOSIS — E29.1 TESTICULAR HYPOFUNCTION: ICD-10-CM

## 2023-09-06 DIAGNOSIS — E78.5 DM TYPE 2 WITH DIABETIC DYSLIPIDEMIA (MULTI): ICD-10-CM

## 2023-09-06 DIAGNOSIS — E11.69 DM TYPE 2 WITH DIABETIC DYSLIPIDEMIA (MULTI): ICD-10-CM

## 2023-09-06 NOTE — TELEPHONE ENCOUNTER
PT OF ANG ANGELA WOULD LIKE TO KNOW IF ANG WOULD LIKE TO REPEAT HIS LABS FROM [facility] AT HIS NEXT APT ON 09/29

## 2023-09-19 ENCOUNTER — APPOINTMENT (OUTPATIENT)
Dept: PRIMARY CARE | Facility: CLINIC | Age: 43
End: 2023-09-19
Payer: COMMERCIAL

## 2023-09-29 ENCOUNTER — APPOINTMENT (OUTPATIENT)
Dept: PRIMARY CARE | Facility: CLINIC | Age: 43
End: 2023-09-29
Payer: COMMERCIAL

## 2023-09-30 DIAGNOSIS — E78.5 DM TYPE 2 WITH DIABETIC DYSLIPIDEMIA (MULTI): ICD-10-CM

## 2023-09-30 DIAGNOSIS — E11.69 DM TYPE 2 WITH DIABETIC DYSLIPIDEMIA (MULTI): ICD-10-CM

## 2023-09-30 RX ORDER — TIRZEPATIDE 2.5 MG/.5ML
INJECTION, SOLUTION SUBCUTANEOUS
Qty: 2 ML | Refills: 0 | Status: SHIPPED
Start: 2023-09-30 | End: 2024-01-16 | Stop reason: WASHOUT

## 2023-10-13 ENCOUNTER — LAB (OUTPATIENT)
Dept: LAB | Facility: LAB | Age: 43
End: 2023-10-13

## 2023-10-13 DIAGNOSIS — E29.1 TESTICULAR HYPOFUNCTION: ICD-10-CM

## 2023-10-13 DIAGNOSIS — E78.5 DM TYPE 2 WITH DIABETIC DYSLIPIDEMIA (MULTI): ICD-10-CM

## 2023-10-13 DIAGNOSIS — E34.9 TESTOSTERONE DEFICIENCY: ICD-10-CM

## 2023-10-13 DIAGNOSIS — E11.69 DM TYPE 2 WITH DIABETIC DYSLIPIDEMIA (MULTI): ICD-10-CM

## 2023-10-13 LAB
ALBUMIN SERPL BCP-MCNC: 4.3 G/DL (ref 3.4–5)
ALP SERPL-CCNC: 88 U/L (ref 33–120)
ALT SERPL W P-5'-P-CCNC: 30 U/L (ref 10–52)
ANION GAP SERPL CALC-SCNC: 12 MMOL/L (ref 10–20)
AST SERPL W P-5'-P-CCNC: 27 U/L (ref 9–39)
BILIRUB SERPL-MCNC: 0.6 MG/DL (ref 0–1.2)
BUN SERPL-MCNC: 10 MG/DL (ref 6–23)
CALCIUM SERPL-MCNC: 9.6 MG/DL (ref 8.6–10.3)
CHLORIDE SERPL-SCNC: 102 MMOL/L (ref 98–107)
CHOLEST SERPL-MCNC: 118 MG/DL (ref 0–199)
CHOLESTEROL/HDL RATIO: 3.6
CO2 SERPL-SCNC: 26 MMOL/L (ref 21–32)
CREAT SERPL-MCNC: 0.95 MG/DL (ref 0.5–1.3)
GFR SERPL CREATININE-BSD FRML MDRD: >90 ML/MIN/1.73M*2
GLUCOSE SERPL-MCNC: 120 MG/DL (ref 74–99)
HDLC SERPL-MCNC: 33.1 MG/DL
LDLC SERPL CALC-MCNC: ABNORMAL MG/DL
NON HDL CHOLESTEROL: 85 MG/DL (ref 0–149)
POTASSIUM SERPL-SCNC: 3.8 MMOL/L (ref 3.5–5.3)
PROT SERPL-MCNC: 7.8 G/DL (ref 6.4–8.2)
SODIUM SERPL-SCNC: 136 MMOL/L (ref 136–145)
TESTOST SERPL-MCNC: 226 NG/DL (ref 240–1000)
TRIGL SERPL-MCNC: 769 MG/DL (ref 0–149)
VLDL: ABNORMAL

## 2023-10-13 PROCEDURE — 83036 HEMOGLOBIN GLYCOSYLATED A1C: CPT

## 2023-10-13 PROCEDURE — 84403 ASSAY OF TOTAL TESTOSTERONE: CPT

## 2023-10-13 PROCEDURE — 80061 LIPID PANEL: CPT

## 2023-10-13 PROCEDURE — 36415 COLL VENOUS BLD VENIPUNCTURE: CPT

## 2023-10-13 PROCEDURE — 80053 COMPREHEN METABOLIC PANEL: CPT

## 2023-10-16 LAB
EST. AVERAGE GLUCOSE BLD GHB EST-MCNC: 151 MG/DL
HBA1C MFR BLD: 6.9 %

## 2023-10-17 ENCOUNTER — OFFICE VISIT (OUTPATIENT)
Dept: PRIMARY CARE | Facility: CLINIC | Age: 43
End: 2023-10-17

## 2023-10-17 VITALS
OXYGEN SATURATION: 97 % | WEIGHT: 269 LBS | HEIGHT: 74 IN | DIASTOLIC BLOOD PRESSURE: 86 MMHG | RESPIRATION RATE: 15 BRPM | BODY MASS INDEX: 34.52 KG/M2 | HEART RATE: 85 BPM | SYSTOLIC BLOOD PRESSURE: 136 MMHG

## 2023-10-17 DIAGNOSIS — K86.1 CHRONIC PANCREATITIS, UNSPECIFIED PANCREATITIS TYPE (MULTI): ICD-10-CM

## 2023-10-17 DIAGNOSIS — E11.69 DM TYPE 2 WITH DIABETIC DYSLIPIDEMIA (MULTI): Primary | ICD-10-CM

## 2023-10-17 DIAGNOSIS — E34.9 TESTOSTERONE DEFICIENCY: ICD-10-CM

## 2023-10-17 DIAGNOSIS — E78.5 DM TYPE 2 WITH DIABETIC DYSLIPIDEMIA (MULTI): Primary | ICD-10-CM

## 2023-10-17 PROCEDURE — 99214 OFFICE O/P EST MOD 30 MIN: CPT | Performed by: FAMILY MEDICINE

## 2023-10-17 ASSESSMENT — ENCOUNTER SYMPTOMS
APPETITE CHANGE: 0
AGITATION: 0
BLOOD IN STOOL: 0
ARTHRALGIAS: 0
EYE PAIN: 0
POLYDIPSIA: 0
CONSTITUTIONAL NEGATIVE: 1
SINUS PAIN: 0
CONFUSION: 0
DIZZINESS: 0
SHORTNESS OF BREATH: 0
HEMATURIA: 0
COLOR CHANGE: 0
RHINORRHEA: 0
RECTAL PAIN: 0
ABDOMINAL PAIN: 0
SPEECH DIFFICULTY: 0
POLYPHAGIA: 0
PHOTOPHOBIA: 0
COUGH: 0
DYSPHORIC MOOD: 0
HEADACHES: 0
FLANK PAIN: 0
MYALGIAS: 0
SORE THROAT: 0
NECK STIFFNESS: 0
DIARRHEA: 0
NERVOUS/ANXIOUS: 0
CONSTIPATION: 0
TROUBLE SWALLOWING: 0
PALPITATIONS: 0
STRIDOR: 0
DYSURIA: 0
SINUS PRESSURE: 0
FATIGUE: 0
FEVER: 0
SLEEP DISTURBANCE: 0
CHEST TIGHTNESS: 0
DECREASED CONCENTRATION: 0
SEIZURES: 0
ADENOPATHY: 0
ACTIVITY CHANGE: 0
ABDOMINAL DISTENTION: 0

## 2023-10-17 NOTE — PATIENT INSTRUCTIONS
Follow up in 3 months    Continue current medications and therapy for chronic medical conditions.    Patient was advised importance of proper diet/nutrition in addition adequate hydration. Patient was encouraged moderate exercise program to include 30 minutes daily for 5 days of the week or 150 minutes weekly. Patient will follow-up with us as scheduled.    Review lab results from October 2023    Increase Mounjaro to 5 mg weekly    Start jess    Start Jatenzo 158 mg twice daily

## 2023-10-17 NOTE — PROGRESS NOTES
"Subjective   Patient ID: Jacky Richardson is a 43 y.o. male who presents for Results, Hyperlipidemia, and Diabetes.    Patient is here for follow up on diabetes and hyperlipidemia.    Patient would like discuss blood work done on 10/13/2023.    Patient denies have any other symptoms or concerns today.         Review of Systems   Constitutional: Negative.  Negative for activity change, appetite change, fatigue and fever.   HENT:  Negative for congestion, dental problem, ear discharge, ear pain, mouth sores, rhinorrhea, sinus pressure, sinus pain, sore throat, tinnitus and trouble swallowing.    Eyes:  Negative for photophobia, pain and visual disturbance.   Respiratory:  Negative for cough, chest tightness, shortness of breath and stridor.    Cardiovascular:  Negative for chest pain and palpitations.   Gastrointestinal:  Negative for abdominal distention, abdominal pain, blood in stool, constipation, diarrhea and rectal pain.   Endocrine: Negative for cold intolerance, heat intolerance, polydipsia, polyphagia and polyuria.   Genitourinary:  Negative for dysuria, flank pain, hematuria and urgency.   Musculoskeletal:  Negative for arthralgias, gait problem, myalgias and neck stiffness.   Skin:  Negative for color change and rash.   Allergic/Immunologic: Negative for environmental allergies and food allergies.   Neurological:  Negative for dizziness, seizures, syncope, speech difficulty and headaches.   Hematological:  Negative for adenopathy.   Psychiatric/Behavioral:  Negative for agitation, confusion, decreased concentration, dysphoric mood and sleep disturbance. The patient is not nervous/anxious.        Objective   /86 (BP Location: Right arm, Patient Position: Sitting, BP Cuff Size: Adult)   Pulse 85   Resp 15   Ht 1.88 m (6' 2\")   Wt 122 kg (269 lb)   SpO2 97%   BMI 34.54 kg/m²     Physical Exam  Vitals reviewed.   Constitutional:       General: He is not in acute distress.     Appearance: He is obese. " He is not ill-appearing or diaphoretic.   HENT:      Head: Normocephalic.      Right Ear: Tympanic membrane and external ear normal.      Left Ear: Tympanic membrane and external ear normal.      Nose: Nose normal. No congestion.      Mouth/Throat:      Pharynx: No posterior oropharyngeal erythema.   Eyes:      General:         Right eye: No discharge.         Left eye: No discharge.      Extraocular Movements: Extraocular movements intact.      Conjunctiva/sclera: Conjunctivae normal.      Pupils: Pupils are equal, round, and reactive to light.   Cardiovascular:      Rate and Rhythm: Normal rate and regular rhythm.      Pulses: Normal pulses.      Heart sounds: Normal heart sounds. No murmur heard.  Pulmonary:      Effort: Pulmonary effort is normal. No respiratory distress.      Breath sounds: Normal breath sounds. No wheezing or rales.   Chest:      Chest wall: No tenderness.   Abdominal:      General: Bowel sounds are normal. There is distension.      Palpations: There is no mass.      Tenderness: There is no abdominal tenderness. There is no guarding.   Musculoskeletal:         General: No tenderness. Normal range of motion.      Cervical back: Normal range of motion and neck supple. No tenderness.      Right lower leg: No edema.      Left lower leg: No edema.   Skin:     General: Skin is dry.      Coloration: Skin is not jaundiced.      Findings: No bruising, erythema or rash.   Neurological:      General: No focal deficit present.      Mental Status: He is alert and oriented to person, place, and time. Mental status is at baseline.      Cranial Nerves: No cranial nerve deficit.      Sensory: No sensory deficit.      Coordination: Coordination normal.      Gait: Gait normal.   Psychiatric:         Mood and Affect: Mood normal.         Thought Content: Thought content normal.         Judgment: Judgment normal.         Assessment/Plan   Problem List Items Addressed This Visit             ICD-10-CM    DM type 2  with diabetic dyslipidemia (CMS/[facility]) - Primary E11.69, E78.5    Relevant Medications    tirzepatide (Mounjaro) 5 mg/0.5 mL pen injector    FreeStyle Aliza sensor system (FreeStyle Aliza 2 Sensor) kit    FreeStyle Aliza reader (FreeStyle Aliza 2 [place]) misc    Chronic pancreatitis, unspecified pancreatitis type (CMS/[facility]) K86.1     resolved          Other Visit Diagnoses         Codes    Testosterone deficiency     E34.9    Relevant Medications    testosterone undecanoate (Jatenzo) 158 mg capsule              Scribe Attestation  By signing my name below, I, KRISTY Gomez , Andres   attest that this documentation has been prepared under the direction and in the presence of Yevgeniy Olmedo DO.   Provider Attestation - Scribe documentation    All medical record entries made by the Scribe were at my direction and personally dictated by me. I have reviewed the chart and agree that the record accurately reflects my personal performance of the history, physical exam, discussion and plan.

## 2023-10-18 DIAGNOSIS — M10.9 GOUT, UNSPECIFIED CAUSE, UNSPECIFIED CHRONICITY, UNSPECIFIED SITE: ICD-10-CM

## 2023-10-18 RX ORDER — TESTOSTERONE UNDECANOATE 158 MG/1
158 CAPSULE, LIQUID FILLED ORAL 2 TIMES DAILY
Qty: 60 CAPSULE | Refills: 0 | Status: SHIPPED
Start: 2023-10-18 | End: 2024-01-16 | Stop reason: WASHOUT

## 2023-10-18 RX ORDER — FLASH GLUCOSE SENSOR
KIT MISCELLANEOUS
Qty: 3 EACH | Refills: 5 | Status: SHIPPED | OUTPATIENT
Start: 2023-10-18

## 2023-10-18 RX ORDER — TIRZEPATIDE 5 MG/.5ML
5 INJECTION, SOLUTION SUBCUTANEOUS
Qty: 2 ML | Refills: 0 | Status: SHIPPED
Start: 2023-10-18 | End: 2024-01-16 | Stop reason: WASHOUT

## 2023-10-18 RX ORDER — FLASH GLUCOSE SCANNING READER
EACH MISCELLANEOUS
Qty: 1 EACH | Refills: 0 | Status: SHIPPED | OUTPATIENT
Start: 2023-10-18

## 2023-10-21 RX ORDER — COLCHICINE 0.6 MG/1
0.6 TABLET ORAL DAILY
Qty: 90 TABLET | Refills: 1 | Status: SHIPPED | OUTPATIENT
Start: 2023-10-21

## 2023-11-15 DIAGNOSIS — R53.83 OTHER FATIGUE: ICD-10-CM

## 2023-11-15 DIAGNOSIS — E78.2 MIXED HYPERLIPIDEMIA: ICD-10-CM

## 2023-11-15 RX ORDER — OMEGA-3-ACID ETHYL ESTERS 1 G/1
CAPSULE, LIQUID FILLED ORAL
Qty: 90 CAPSULE | Refills: 1 | Status: SHIPPED | OUTPATIENT
Start: 2023-11-15 | End: 2024-05-16 | Stop reason: SDUPTHER

## 2023-11-15 RX ORDER — FENOFIBRATE 145 MG/1
TABLET, FILM COATED ORAL
Qty: 30 TABLET | Refills: 5 | Status: SHIPPED | OUTPATIENT
Start: 2023-11-15

## 2024-01-16 ENCOUNTER — OFFICE VISIT (OUTPATIENT)
Dept: PRIMARY CARE | Facility: CLINIC | Age: 44
End: 2024-01-16

## 2024-01-16 VITALS
DIASTOLIC BLOOD PRESSURE: 86 MMHG | BODY MASS INDEX: 35.09 KG/M2 | HEART RATE: 78 BPM | OXYGEN SATURATION: 96 % | SYSTOLIC BLOOD PRESSURE: 124 MMHG | TEMPERATURE: 98.5 F | RESPIRATION RATE: 16 BRPM | WEIGHT: 273.4 LBS | HEIGHT: 74 IN

## 2024-01-16 DIAGNOSIS — E11.69 DM TYPE 2 WITH DIABETIC DYSLIPIDEMIA (MULTI): Primary | ICD-10-CM

## 2024-01-16 DIAGNOSIS — E66.01 MORBID OBESITY (MULTI): ICD-10-CM

## 2024-01-16 DIAGNOSIS — G47.33 OBSTRUCTIVE SLEEP APNEA: ICD-10-CM

## 2024-01-16 DIAGNOSIS — E78.2 MIXED HYPERLIPIDEMIA: ICD-10-CM

## 2024-01-16 DIAGNOSIS — I10 ESSENTIAL HYPERTENSION: ICD-10-CM

## 2024-01-16 DIAGNOSIS — E78.5 DM TYPE 2 WITH DIABETIC DYSLIPIDEMIA (MULTI): Primary | ICD-10-CM

## 2024-01-16 PROBLEM — G47.19 EXCESSIVE DAYTIME SLEEPINESS: Status: ACTIVE | Noted: 2024-01-16

## 2024-01-16 LAB
POC FINGERSTICK BLOOD GLUCOSE: 205 MG/DL (ref 70–100)
POC HEMOGLOBIN A1C: 7.5 % (ref 4.2–6.5)

## 2024-01-16 PROCEDURE — 99212 OFFICE O/P EST SF 10 MIN: CPT | Performed by: FAMILY MEDICINE

## 2024-01-16 PROCEDURE — 83036 HEMOGLOBIN GLYCOSYLATED A1C: CPT | Performed by: FAMILY MEDICINE

## 2024-01-16 PROCEDURE — 82962 GLUCOSE BLOOD TEST: CPT | Performed by: FAMILY MEDICINE

## 2024-01-16 ASSESSMENT — ENCOUNTER SYMPTOMS
SHORTNESS OF BREATH: 0
HEADACHES: 0
BACK PAIN: 1
ABDOMINAL DISTENTION: 0
RHINORRHEA: 0
SINUS PRESSURE: 0
PALPITATIONS: 0
FEVER: 0
ADENOPATHY: 0
PHOTOPHOBIA: 0
DYSPHORIC MOOD: 0
ACTIVITY CHANGE: 0
AGITATION: 0
CONFUSION: 0
COUGH: 0
APPETITE CHANGE: 0
FATIGUE: 1
SINUS PAIN: 0
COLOR CHANGE: 0
RECTAL PAIN: 0
CONSTIPATION: 0
NERVOUS/ANXIOUS: 0
POLYPHAGIA: 0
DECREASED CONCENTRATION: 0
ABDOMINAL PAIN: 0
MYALGIAS: 0
BLOOD IN STOOL: 0
DIARRHEA: 0
NECK STIFFNESS: 0
ARTHRALGIAS: 0
SORE THROAT: 0
TROUBLE SWALLOWING: 0
FLANK PAIN: 0
CHEST TIGHTNESS: 0
SLEEP DISTURBANCE: 0
HEMATURIA: 0
DYSURIA: 0
STRIDOR: 0
POLYDIPSIA: 0
DIZZINESS: 0
SEIZURES: 0
EYE PAIN: 0
SPEECH DIFFICULTY: 0

## 2024-01-16 NOTE — PATIENT INSTRUCTIONS
Follow up in 3 MONTHS     Continue current medications and therapy for chronic medical conditions.    Patient was advised importance of proper diet/nutrition in addition adequate hydration. Patient was encouraged moderate exercise program to include 30 minutes daily for 5 days of the week or 150 minutes weekly. Patient will follow-up with us as scheduled.    START OZEMPIC 0.25MG ONCE WEEKLY         Ways to Help Prevent Falls at Home    Quick Tips   ? Ask for help if you need it. Most people want to help!   ? Get up slowly after sitting or laying down   ? Wear a medical alert device or keep cell phone in your pocket   ? Use night lights, especially areas near a bathroom   ? Keep the items you use often within reach on a small stool or end table   ? Use an assistive device such as walker or cane, as directed by provider/physical therapy   ? Use a non-slip mat and grab bars in your bathroom. Look for home health sections for best options     Other Areas to Focus On   ? Exercise and nutrition: Regular exercise or taking a falls prevention class are great ways improve strength and balance. Don’t forget to stay hydrated and bring a snack!   ? Medicine side effects: Some medicines can make you sleepy or dizzy, which could cause a fall. Ask your healthcare provider about the side effects your medicines could cause. Be sure to let them know if you take any vitamins or supplements as well.   ? Tripping hazards: Remove items you could trip on, such as loose mats, rugs, cords, and clutter. Wear closed toe shoes with rubber soles.   ? Health and wellness: Get regular checkups with your healthcare provider, plus routine vision and hearing screenings. Talk with your healthcare provider about:   o Your medicines and the possible side effects - bring them in a bag if that is easier!   o Problems with balance or feeling dizzy   o Ways to promote bone health, such as Vitamin D and calcium supplements   o Questions or concerns about  falling     *Ask your healthcare team if you have questions     ©[facility], 2022

## 2024-01-16 NOTE — PROGRESS NOTES
Subjective   Patient ID: Jacky Richardson is a 43 y.o. male who presents for Diabetes.    Patient is here for follow up on diabetes.    Patient states feels tired all the time.     Patient states having some lower back pain x 2 weeks. Patient denies takes any medication to relief the pain.    Patient states does not have insurance and he can not afford his medication.    Patient denies any other symptoms or concerns today.    Diabetes  He presents for his follow-up diabetic visit. He has type 2 diabetes mellitus. There are no hypoglycemic associated symptoms. Pertinent negatives for hypoglycemia include no confusion, dizziness, headaches, nervousness/anxiousness, seizures or speech difficulty. Associated symptoms include fatigue. Pertinent negatives for diabetes include no chest pain, no polydipsia, no polyphagia and no polyuria. There are no hypoglycemic complications.        Review of Systems   Constitutional:  Positive for fatigue. Negative for activity change, appetite change and fever.   HENT:  Negative for congestion, dental problem, ear discharge, ear pain, mouth sores, rhinorrhea, sinus pressure, sinus pain, sore throat, tinnitus and trouble swallowing.    Eyes:  Negative for photophobia, pain and visual disturbance.   Respiratory:  Negative for cough, chest tightness, shortness of breath and stridor.    Cardiovascular:  Negative for chest pain and palpitations.   Gastrointestinal:  Negative for abdominal distention, abdominal pain, blood in stool, constipation, diarrhea and rectal pain.   Endocrine: Negative for cold intolerance, heat intolerance, polydipsia, polyphagia and polyuria.   Genitourinary:  Negative for dysuria, flank pain, hematuria and urgency.   Musculoskeletal:  Positive for back pain. Negative for arthralgias, gait problem, myalgias and neck stiffness.   Skin:  Negative for color change and rash.   Allergic/Immunologic: Negative for environmental allergies and food allergies.   Neurological:   "Negative for dizziness, seizures, syncope, speech difficulty and headaches.   Hematological:  Negative for adenopathy.   Psychiatric/Behavioral:  Negative for agitation, confusion, decreased concentration, dysphoric mood and sleep disturbance. The patient is not nervous/anxious.        Objective   /86 (BP Location: Right arm, Patient Position: Sitting, BP Cuff Size: Large adult)   Pulse 78   Temp 36.9 °C (98.5 °F)   Resp 16   Ht 1.88 m (6' 2\")   Wt 124 kg (273 lb 6.4 oz)   SpO2 96%   BMI 35.10 kg/m²     Physical Exam  Vitals reviewed.   Constitutional:       General: He is not in acute distress.     Appearance: He is obese. He is not ill-appearing or diaphoretic.   HENT:      Head: Normocephalic.      Right Ear: Tympanic membrane and external ear normal.      Left Ear: Tympanic membrane and external ear normal.      Nose: Nose normal. No congestion.      Mouth/Throat:      Pharynx: No posterior oropharyngeal erythema.   Eyes:      General:         Right eye: No discharge.         Left eye: No discharge.      Extraocular Movements: Extraocular movements intact.      Conjunctiva/sclera: Conjunctivae normal.      Pupils: Pupils are equal, round, and reactive to light.   Cardiovascular:      Rate and Rhythm: Normal rate and regular rhythm.      Pulses: Normal pulses.      Heart sounds: Normal heart sounds. No murmur heard.  Pulmonary:      Effort: Pulmonary effort is normal. No respiratory distress.      Breath sounds: Normal breath sounds. No wheezing or rales.   Chest:      Chest wall: No tenderness.   Abdominal:      General: Bowel sounds are normal. There is distension.      Palpations: There is no mass.      Tenderness: There is no abdominal tenderness. There is no guarding.   Musculoskeletal:         General: No tenderness. Normal range of motion.      Cervical back: Normal range of motion and neck supple. No tenderness.      Right lower leg: No edema.      Left lower leg: No edema.   Skin:     " General: Skin is dry.      Coloration: Skin is not jaundiced.      Findings: No bruising, erythema or rash.   Neurological:      General: No focal deficit present.      Mental Status: He is alert and oriented to person, place, and time. Mental status is at baseline.      Cranial Nerves: No cranial nerve deficit.      Sensory: No sensory deficit.      Coordination: Coordination normal.      Gait: Gait normal.   Psychiatric:         Mood and Affect: Mood normal.         Thought Content: Thought content normal.         Judgment: Judgment normal.         Assessment/Plan   Problem List Items Addressed This Visit             ICD-10-CM    Mixed hyperlipidemia E78.2    DM type 2 with diabetic dyslipidemia (CMS/[facility]) - Primary E11.69, E78.5    Relevant Medications    semaglutide 0.25 mg or 0.5 mg (2 mg/3 mL) pen injector    Other Relevant Orders    POCT glycosylated hemoglobin (Hb A1C) manually resulted (Completed)    POCT Fingerstick Glucose manually resulted (Completed)    Follow Up In [facility] - Pharmacy    Essential hypertension I10    Obstructive sleep apnea G47.33    Morbid obesity (CMS/[facility]) E66.01         Scribe Attestation  By signing my name below, IBlanca RMA , Scribe   attest that this documentation has been prepared under the direction and in the presence of Yevgeniy Olmedo DO.   Provider Attestation - Scribe documentation    All medical record entries made by the Scribe were at my direction and personally dictated by me. I have reviewed the chart and agree that the record accurately reflects my personal performance of the history, physical exam, discussion and plan.  Patient was identified as a fall risk. Risk prevention instructions provided.

## 2024-01-24 ENCOUNTER — TELEMEDICINE (OUTPATIENT)
Dept: PHARMACY | Facility: HOSPITAL | Age: 44
End: 2024-01-24

## 2024-01-24 DIAGNOSIS — E11.69 DM TYPE 2 WITH DIABETIC DYSLIPIDEMIA (MULTI): ICD-10-CM

## 2024-01-24 DIAGNOSIS — E78.5 DM TYPE 2 WITH DIABETIC DYSLIPIDEMIA (MULTI): ICD-10-CM

## 2024-01-24 ASSESSMENT — ENCOUNTER SYMPTOMS: DIABETIC ASSOCIATED SYMPTOMS: 0

## 2024-01-24 NOTE — PROGRESS NOTES
Clinical Pharmacy Appointment    Subjective   Patient ID: Jacky Richardson is a 43 y.o. male who presents for Diabetes.    Referring Provider: Yevgeniy Olmedo,      Diabetes  He presents for his initial diabetic visit. He has type 2 diabetes mellitus. The initial diagnosis of diabetes was made 10 years ago. His disease course has been fluctuating. There are no hypoglycemic associated symptoms. There are no diabetic associated symptoms. There are no hypoglycemic complications. Risk factors for coronary artery disease include male sex, family history, dyslipidemia, diabetes mellitus, hypertension and obesity. Current diabetic treatments: Novolin N 60 units, Novolin R 15 units, Ozempic 0.25mg once weekly (provided by samples) Exercise: Patient is a  and gets a lot of movement in with working. (Patient takes his blood sugars daily. States in the morning around 140mg/dL and post prandial around 230mg/dL.) An ACE inhibitor/angiotensin II receptor blocker is being taken. He does not see a podiatrist.Eye exam is current.     Objective     Labs  Lab Results   Component Value Date    BILITOT 0.6 10/13/2023    CALCIUM 9.6 10/13/2023    CO2 26 10/13/2023     10/13/2023    CREATININE 0.95 10/13/2023    GLUCOSE 120 (H) 10/13/2023    ALKPHOS 88 10/13/2023    K 3.8 10/13/2023    PROT 7.8 10/13/2023     10/13/2023    AST 27 10/13/2023    ALT 30 10/13/2023    BUN 10 10/13/2023    ANIONGAP 12 10/13/2023    ALBUMIN 4.3 10/13/2023    GFRMALE >90 06/15/2023     Lab Results   Component Value Date    TRIG 769 (H) 10/13/2023    CHOL 118 10/13/2023    LDLCALC  10/13/2023      Comment:      The calculation of LDL and VLDL are inaccurate when the Triglycerides are greater than 400 mg/dL or when the patient is non-fasting. If LDL measurement is necessary contact the testing laboratory for an alternative LDL assay.                                  Near   Borderline      AGE      Desirable  Optimal    High     High     Very  High     0-19 Y     0 - 109     ---    110-129   >/= 130     ----    20-24 Y     0 - 119     ---    120-159   >/= 160     ----      >24 Y     0 -  99   100-129  130-159   160-189     >/=190      HDL 33.1 10/13/2023     Lab Results   Component Value Date    HGBA1C 7.5 (A) 01/16/2024     Current Outpatient Medications on File Prior to Visit   Medication Sig Dispense Refill    allopurinol (Zyloprim) 100 mg tablet TAKE ONE TABLET BY MOUTH TWICE A  tablet 1    atorvastatin (Lipitor) 40 mg tablet Take 1 tablet (40 mg) by mouth once daily. 30 tablet 5    colchicine, gout, 0.6 mg tablet Take 1 tablet (0.6 mg) by mouth once daily. (Patient taking differently: Take 1 tablet (0.6 mg) by mouth once daily as needed.) 90 tablet 1    fenofibrate (Tricor) 145 mg tablet TAKE ONE TABLET BY MOUTH EVERY DAY 30 tablet 5    lisinopriL-hydrochlorothiazide 20-12.5 mg tablet TAKE ONE TABLET BY MOUTH EVERY DAY 30 tablet 5    omega-3 acid ethyl esters (Lovaza) 1 gram capsule TAKE ONE CAPSULE BY MOUTH EVERY DAY 90 capsule 1    semaglutide 0.25 mg or 0.5 mg (2 mg/3 mL) pen injector Inject 0.25 mg under the skin 1 (one) time per week. 2 mL 0    cetirizine (ZyrTEC) 10 mg tablet Take 1 tablet (10 mg) by mouth once daily.      FreeStyle Aliza reader (FreeStyle Aliza 2 Martelle) misc TEST 4-5 TIMES DAILY 1 each 0    FreeStyle Aliza sensor system (FreeStyle Aliza 2 Sensor) kit TEST 4-5 TIMES DAILY 3 each 5    [DISCONTINUED] insulin lispro (HumaLOG) 100 unit/mL injection INJECT 15 UNITS UNDER THE SKIN THREE TIMES A DAY BEFORE MEALS (Patient not taking: Reported on 1/16/2024) 15 mL 5     No current facility-administered medications on file prior to visit.      Assessment/Plan   Problem List Items Addressed This Visit             ICD-10-CM    DM type 2 with diabetic dyslipidemia (CMS/[facility]) E11.69, E78.5     Patients diabetes is uncontrolled as evidenced by the most recent A1c of 7.5% (Goal <7%) on 1/16/2024.  CONTINUE all medications as  prescribed  Patient does not currently have prescription insurance.   Will look into Jardiance, Ozempic, and Insulin PAP programs.  Continue checking blood sugars 2-3 times a day.  Continue working towards healthy diet and lifestyle.          Follow up with Clinical Pharmacy Team 1/29/2024 at 4:00PM IN PERSON to discuss patient assistance programs.    Continue all meds under the continuation of care with the referring provider and clinical pharmacy team.    Please reach out to the Clinical Pharmacy Team if there are any further questions.     Verbal consent to manage patient's drug therapy was obtained from patient. They were informed they may decline to participate or withdraw from participation in pharmacy services at any time.    Renee Canada, PharmD  [phone]

## 2024-01-25 NOTE — ASSESSMENT & PLAN NOTE
Patients diabetes is uncontrolled as evidenced by the most recent A1c of 7.5% (Goal <7%) on 1/16/2024.  CONTINUE all medications as prescribed  Patient does not currently have prescription insurance.   Will look into Jardiance, Ozempic, and Insulin PAP programs.  Continue checking blood sugars 2-3 times a day.  Continue working towards healthy diet and lifestyle.

## 2024-02-15 ENCOUNTER — APPOINTMENT (OUTPATIENT)
Dept: PHARMACY | Facility: HOSPITAL | Age: 44
End: 2024-02-15

## 2024-02-20 ENCOUNTER — TELEMEDICINE (OUTPATIENT)
Dept: PHARMACY | Facility: HOSPITAL | Age: 44
End: 2024-02-20

## 2024-02-20 DIAGNOSIS — E78.5 DM TYPE 2 WITH DIABETIC DYSLIPIDEMIA (MULTI): ICD-10-CM

## 2024-02-20 DIAGNOSIS — E11.69 DM TYPE 2 WITH DIABETIC DYSLIPIDEMIA (MULTI): ICD-10-CM

## 2024-02-20 ASSESSMENT — ENCOUNTER SYMPTOMS: DIABETIC ASSOCIATED SYMPTOMS: 0

## 2024-02-20 NOTE — PROGRESS NOTES
Clinical Pharmacy Appointment    Subjective   Patient ID: Jacky Richardson is a 44 y.o. male who presents for Diabetes.    Referring Provider: Yevgeniy Olmedo,      Diabetes  He presents for his follow-up diabetic visit. He has type 2 diabetes mellitus. The initial diagnosis of diabetes was made 10 years ago. His disease course has been fluctuating. There are no hypoglycemic associated symptoms. There are no diabetic associated symptoms. There are no hypoglycemic complications. Risk factors for coronary artery disease include male sex, family history, dyslipidemia, diabetes mellitus, hypertension and obesity. Current diabetic treatments: Novolin N 60 units, Novolin R 15 units, Ozempic 0.25mg once weekly (provided by samples) Exercise: Patient is a  and gets a lot of movement in with working. (Patient takes his blood sugars daily. States in the morning around 140mg/dL and post prandial around 230mg/dL.) An ACE inhibitor/angiotensin II receptor blocker is being taken. He does not see a podiatrist.Eye exam is current.     Objective     Labs  Lab Results   Component Value Date    BILITOT 0.6 10/13/2023    CALCIUM 9.6 10/13/2023    CO2 26 10/13/2023     10/13/2023    CREATININE 0.95 10/13/2023    GLUCOSE 120 (H) 10/13/2023    ALKPHOS 88 10/13/2023    K 3.8 10/13/2023    PROT 7.8 10/13/2023     10/13/2023    AST 27 10/13/2023    ALT 30 10/13/2023    BUN 10 10/13/2023    ANIONGAP 12 10/13/2023    ALBUMIN 4.3 10/13/2023    GFRMALE >90 06/15/2023     Lab Results   Component Value Date    TRIG 769 (H) 10/13/2023    CHOL 118 10/13/2023    LDLCALC  10/13/2023      Comment:      The calculation of LDL and VLDL are inaccurate when the Triglycerides are greater than 400 mg/dL or when the patient is non-fasting. If LDL measurement is necessary contact the testing laboratory for an alternative LDL assay.                                  Near   Borderline      AGE      Desirable  Optimal    High     High     Very  High     0-19 Y     0 - 109     ---    110-129   >/= 130     ----    20-24 Y     0 - 119     ---    120-159   >/= 160     ----      >24 Y     0 -  99   100-129  130-159   160-189     >/=190      HDL 33.1 10/13/2023     Lab Results   Component Value Date    HGBA1C 7.5 (A) 01/16/2024     Current Outpatient Medications on File Prior to Visit   Medication Sig Dispense Refill    allopurinol (Zyloprim) 100 mg tablet TAKE ONE TABLET BY MOUTH TWICE A  tablet 1    atorvastatin (Lipitor) 40 mg tablet Take 1 tablet (40 mg) by mouth once daily. 30 tablet 5    cetirizine (ZyrTEC) 10 mg tablet Take 1 tablet (10 mg) by mouth once daily.      colchicine, gout, 0.6 mg tablet Take 1 tablet (0.6 mg) by mouth once daily. (Patient taking differently: Take 1 tablet (0.6 mg) by mouth once daily as needed.) 90 tablet 1    fenofibrate (Tricor) 145 mg tablet TAKE ONE TABLET BY MOUTH EVERY DAY 30 tablet 5    FreeStyle Aliza reader (FreeStyle Aliza 2 [place]) misc TEST 4-5 TIMES DAILY 1 each 0    FreeStyle Aliza sensor system (FreeStyle Aliza 2 Sensor) kit TEST 4-5 TIMES DAILY 3 each 5    lisinopriL-hydrochlorothiazide 20-12.5 mg tablet TAKE ONE TABLET BY MOUTH EVERY DAY 30 tablet 5    omega-3 acid ethyl esters (Lovaza) 1 gram capsule TAKE ONE CAPSULE BY MOUTH EVERY DAY 90 capsule 1    semaglutide 0.25 mg or 0.5 mg (2 mg/3 mL) pen injector Inject 0.25 mg under the skin 1 (one) time per week. 2 mL 0     No current facility-administered medications on file prior to visit.      Assessment/Plan   Problem List Items Addressed This Visit             ICD-10-CM    DM type 2 with diabetic dyslipidemia (CMS/HCC) E11.69, E78.5     Patients diabetes is fairly controlled as evidenced by the most recent A1c of 7.5% (Goal <7%) on 1/16/2024.  RX CHANGES  INCREASE Novolin N to 65 untis once daily  CONTINUE Novolin R 15 units 3 times a day with meals.  CONTINUE Ozempic 0.5mg under the skin once weekly (medication provided by sample)  Continue checking  blood sugars 4 times a day (morning and with meals)  Patient states his morning blood sugars are typically around 140-180mg/dL but throughout the day 250-260mg/dL.  Patient does not have prescription insurance. States his job does not offer insurance and he has been too busy to look into insurance. Discussed with patient that with his medications it would be important to have prescription insurance. Discussed [employer] patient assistance programs with the patient, but did not show interest in pursuing.   Continue working towards healthy diet and lifestyle.          Follow up with Clinical Pharmacy Team 3/19/2024 at 3:30PM to discuss patient assistance programs.    Continue all meds under the continuation of care with the referring provider and clinical pharmacy team.    Please reach out to the Clinical Pharmacy Team if there are any further questions.     Verbal consent to manage patient's drug therapy was obtained from patient. They were informed they may decline to participate or withdraw from participation in pharmacy services at any time.    Renee Canada, PharmD  [phone]

## 2024-02-21 NOTE — ASSESSMENT & PLAN NOTE
Patients diabetes is fairly controlled as evidenced by the most recent A1c of 7.5% (Goal <7%) on 1/16/2024.  RX CHANGES  INCREASE Novolin N to 65 untis once daily  CONTINUE Novolin R 15 units 3 times a day with meals.  CONTINUE Ozempic 0.5mg under the skin once weekly (medication provided by sample)  Continue checking blood sugars 4 times a day (morning and with meals)  Patient states his morning blood sugars are typically around 140-180mg/dL but throughout the day 250-260mg/dL.  Patient does not have prescription insurance. States his job does not offer insurance and he has been too busy to look into insurance. Discussed with patient that with his medications it would be important to have prescription insurance. Discussed [employer] patient assistance programs with the patient, but did not show interest in pursuing.   Continue working towards healthy diet and lifestyle.

## 2024-03-19 ENCOUNTER — TELEMEDICINE (OUTPATIENT)
Dept: PHARMACY | Facility: HOSPITAL | Age: 44
End: 2024-03-19

## 2024-03-19 DIAGNOSIS — E11.69 DM TYPE 2 WITH DIABETIC DYSLIPIDEMIA (MULTI): ICD-10-CM

## 2024-03-19 DIAGNOSIS — E78.5 DM TYPE 2 WITH DIABETIC DYSLIPIDEMIA (MULTI): ICD-10-CM

## 2024-03-19 ASSESSMENT — ENCOUNTER SYMPTOMS: DIABETIC ASSOCIATED SYMPTOMS: 0

## 2024-03-19 NOTE — ASSESSMENT & PLAN NOTE
Patients diabetes is fairly controlled as evidenced by the most recent A1c of 7.5% (Goal <7%) on 1/16/2024.  RX CHANGES  INCREASE Novolin N to 68 untis once daily  CONTINUE Novolin R 15 units 3 times a day with meals.  CONTINUE Ozempic 0.5mg under the skin once weekly (medication provided by sample)  Continue checking blood sugars 4 times a day (morning and with meals)  Patient states his morning blood sugars are typically around 140-180mg/dL but throughout the day 180-220mg/dL.  Patient does not have prescription insurance. States his job does not offer insurance and he has been too busy to look into insurance. Discussed with patient that with his medications it would be important to have prescription insurance. Discussed [employer] patient assistance programs with the patient, but did not show interest in pursuing.   Continue working towards healthy diet and lifestyle.

## 2024-03-19 NOTE — PROGRESS NOTES
Clinical Pharmacy Appointment    Subjective   Patient ID: Jacky Richardson is a 44 y.o. male who presents for Diabetes.    Referring Provider: Yevgeniy Olmedo,      Diabetes  He presents for his follow-up diabetic visit. He has type 2 diabetes mellitus. The initial diagnosis of diabetes was made 10 years ago. His disease course has been fluctuating. There are no hypoglycemic associated symptoms. There are no diabetic associated symptoms. There are no hypoglycemic complications. Risk factors for coronary artery disease include male sex, family history, dyslipidemia, diabetes mellitus, hypertension and obesity. Current diabetic treatments: Novolin N 65 units, Novolin R 15 units, Ozempic 0.5mg once weekly (provided by samples) Exercise: Patient is a  and gets a lot of movement in with working. (Patient takes his blood sugars daily. States in the morning around 140mg/dL and post prandial around 230mg/dL.) An ACE inhibitor/angiotensin II receptor blocker is being taken. He does not see a podiatrist.Eye exam is current.     Objective     Labs  Lab Results   Component Value Date    BILITOT 0.6 10/13/2023    CALCIUM 9.6 10/13/2023    CO2 26 10/13/2023     10/13/2023    CREATININE 0.95 10/13/2023    GLUCOSE 120 (H) 10/13/2023    ALKPHOS 88 10/13/2023    K 3.8 10/13/2023    PROT 7.8 10/13/2023     10/13/2023    AST 27 10/13/2023    ALT 30 10/13/2023    BUN 10 10/13/2023    ANIONGAP 12 10/13/2023    ALBUMIN 4.3 10/13/2023    GFRMALE >90 06/15/2023     Lab Results   Component Value Date    TRIG 769 (H) 10/13/2023    CHOL 118 10/13/2023    LDLCALC  10/13/2023      Comment:      The calculation of LDL and VLDL are inaccurate when the Triglycerides are greater than 400 mg/dL or when the patient is non-fasting. If LDL measurement is necessary contact the testing laboratory for an alternative LDL assay.                                  Near   Borderline      AGE      Desirable  Optimal    High     High     Very  High     0-19 Y     0 - 109     ---    110-129   >/= 130     ----    20-24 Y     0 - 119     ---    120-159   >/= 160     ----      >24 Y     0 -  99   100-129  130-159   160-189     >/=190      HDL 33.1 10/13/2023     Lab Results   Component Value Date    HGBA1C 7.5 (A) 01/16/2024     Current Outpatient Medications on File Prior to Visit   Medication Sig Dispense Refill    allopurinol (Zyloprim) 100 mg tablet TAKE ONE TABLET BY MOUTH TWICE A  tablet 1    atorvastatin (Lipitor) 40 mg tablet Take 1 tablet (40 mg) by mouth once daily. 30 tablet 5    cetirizine (ZyrTEC) 10 mg tablet Take 1 tablet (10 mg) by mouth once daily.      colchicine, gout, 0.6 mg tablet Take 1 tablet (0.6 mg) by mouth once daily. (Patient taking differently: Take 1 tablet (0.6 mg) by mouth once daily as needed.) 90 tablet 1    fenofibrate (Tricor) 145 mg tablet TAKE ONE TABLET BY MOUTH EVERY DAY 30 tablet 5    FreeStyle Aliza reader (FreeStyle Aliza 2 [place]) misc TEST 4-5 TIMES DAILY 1 each 0    FreeStyle Aliza sensor system (FreeStyle Aliza 2 Sensor) kit TEST 4-5 TIMES DAILY 3 each 5    lisinopriL-hydrochlorothiazide 20-12.5 mg tablet TAKE ONE TABLET BY MOUTH EVERY DAY 30 tablet 5    omega-3 acid ethyl esters (Lovaza) 1 gram capsule TAKE ONE CAPSULE BY MOUTH EVERY DAY 90 capsule 1    semaglutide 0.25 mg or 0.5 mg (2 mg/3 mL) pen injector Inject 0.25 mg under the skin 1 (one) time per week. 2 mL 0     No current facility-administered medications on file prior to visit.      Assessment/Plan   Problem List Items Addressed This Visit             ICD-10-CM    DM type 2 with diabetic dyslipidemia (CMS/HCC) E11.69, E78.5     Patients diabetes is fairly controlled as evidenced by the most recent A1c of 7.5% (Goal <7%) on 1/16/2024.  RX CHANGES  INCREASE Novolin N to 68 untis once daily  CONTINUE Novolin R 15 units 3 times a day with meals.  CONTINUE Ozempic 0.5mg under the skin once weekly (medication provided by sample)  Continue checking  blood sugars 4 times a day (morning and with meals)  Patient states his morning blood sugars are typically around 140-180mg/dL but throughout the day 180-220mg/dL.  Patient does not have prescription insurance. States his job does not offer insurance and he has been too busy to look into insurance. Discussed with patient that with his medications it would be important to have prescription insurance. Discussed manufacture patient assistance programs with the patient, but did not show interest in pursuing.   Continue working towards healthy diet and lifestyle.          Follow up with Clinical Pharmacy Team 4/23/2024 at 3:30PM to discuss patient assistance programs.    Continue all meds under the continuation of care with the referring provider and clinical pharmacy team.    Please reach out to the Clinical Pharmacy Team if there are any further questions.     Verbal consent to manage patient's drug therapy was obtained from patient. They were informed they may decline to participate or withdraw from participation in pharmacy services at any time.    Renee Canada, PharmD  [phone]

## 2024-03-30 DIAGNOSIS — M10.9 GOUT, UNSPECIFIED CAUSE, UNSPECIFIED CHRONICITY, UNSPECIFIED SITE: ICD-10-CM

## 2024-04-03 RX ORDER — ALLOPURINOL 100 MG/1
TABLET ORAL
Qty: 180 TABLET | Refills: 1 | Status: SHIPPED | OUTPATIENT
Start: 2024-04-03 | End: 2024-05-16 | Stop reason: SDUPTHER

## 2024-04-16 ENCOUNTER — OFFICE VISIT (OUTPATIENT)
Dept: PRIMARY CARE | Facility: CLINIC | Age: 44
End: 2024-04-16

## 2024-04-16 VITALS
BODY MASS INDEX: 32.6 KG/M2 | HEART RATE: 96 BPM | WEIGHT: 254 LBS | TEMPERATURE: 97.3 F | SYSTOLIC BLOOD PRESSURE: 136 MMHG | DIASTOLIC BLOOD PRESSURE: 72 MMHG | RESPIRATION RATE: 16 BRPM | OXYGEN SATURATION: 96 % | HEIGHT: 74 IN

## 2024-04-16 DIAGNOSIS — E11.69 DM TYPE 2 WITH DIABETIC DYSLIPIDEMIA (MULTI): Primary | ICD-10-CM

## 2024-04-16 DIAGNOSIS — E78.5 DM TYPE 2 WITH DIABETIC DYSLIPIDEMIA (MULTI): Primary | ICD-10-CM

## 2024-04-16 DIAGNOSIS — G47.33 OBSTRUCTIVE SLEEP APNEA: ICD-10-CM

## 2024-04-16 DIAGNOSIS — I10 ESSENTIAL HYPERTENSION: ICD-10-CM

## 2024-04-16 DIAGNOSIS — E66.01 MORBID OBESITY (MULTI): ICD-10-CM

## 2024-04-16 LAB
POC FINGERSTICK BLOOD GLUCOSE: 210 MG/DL (ref 70–100)
POC HEMOGLOBIN A1C: 8.2 % (ref 4.2–6.5)

## 2024-04-16 PROCEDURE — 82962 GLUCOSE BLOOD TEST: CPT | Performed by: FAMILY MEDICINE

## 2024-04-16 PROCEDURE — 99212 OFFICE O/P EST SF 10 MIN: CPT | Performed by: FAMILY MEDICINE

## 2024-04-16 PROCEDURE — 83036 HEMOGLOBIN GLYCOSYLATED A1C: CPT | Performed by: FAMILY MEDICINE

## 2024-04-16 ASSESSMENT — ENCOUNTER SYMPTOMS
HEMATURIA: 0
SINUS PAIN: 0
ADENOPATHY: 0
HEADACHES: 0
STRIDOR: 0
APPETITE CHANGE: 0
EYE PAIN: 0
SEIZURES: 0
COUGH: 0
CONFUSION: 0
SLEEP DISTURBANCE: 0
FEVER: 0
RECTAL PAIN: 0
BLOOD IN STOOL: 0
PHOTOPHOBIA: 0
DIARRHEA: 0
CONSTIPATION: 0
COLOR CHANGE: 0
SPEECH DIFFICULTY: 0
ARTHRALGIAS: 0
ABDOMINAL DISTENTION: 0
FATIGUE: 1
NERVOUS/ANXIOUS: 0
PALPITATIONS: 0
MYALGIAS: 0
ACTIVITY CHANGE: 0
TROUBLE SWALLOWING: 0
DYSURIA: 0
NECK STIFFNESS: 0
POLYDIPSIA: 0
AGITATION: 0
FLANK PAIN: 0
SORE THROAT: 0
RHINORRHEA: 0
DECREASED CONCENTRATION: 0
ABDOMINAL PAIN: 0
SINUS PRESSURE: 0
SHORTNESS OF BREATH: 0
DYSPHORIC MOOD: 0
POLYPHAGIA: 0
DIZZINESS: 0
CHEST TIGHTNESS: 0

## 2024-04-16 ASSESSMENT — PATIENT HEALTH QUESTIONNAIRE - PHQ9
SUM OF ALL RESPONSES TO PHQ9 QUESTIONS 1 AND 2: 0
2. FEELING DOWN, DEPRESSED OR HOPELESS: NOT AT ALL
1. LITTLE INTEREST OR PLEASURE IN DOING THINGS: NOT AT ALL

## 2024-04-16 NOTE — PROGRESS NOTES
Subjective   Patient ID: Jacky Richardson is a 44 y.o. male who presents for Diabetes.    Patient denies any symptoms or concerns today.    Diabetes  He presents for his follow-up diabetic visit. He has type 2 diabetes mellitus. There are no hypoglycemic associated symptoms. Pertinent negatives for hypoglycemia include no confusion, dizziness, headaches, nervousness/anxiousness, seizures or speech difficulty. Associated symptoms include fatigue. Pertinent negatives for diabetes include no chest pain, no polydipsia, no polyphagia and no polyuria. There are no hypoglycemic complications. There are no diabetic complications.        Review of Systems   Constitutional:  Positive for fatigue. Negative for activity change, appetite change and fever.   HENT:  Negative for congestion, dental problem, ear discharge, ear pain, mouth sores, rhinorrhea, sinus pressure, sinus pain, sore throat, tinnitus and trouble swallowing.    Eyes:  Negative for photophobia, pain and visual disturbance.   Respiratory:  Negative for cough, chest tightness, shortness of breath and stridor.    Cardiovascular:  Negative for chest pain and palpitations.   Gastrointestinal:  Negative for abdominal distention, abdominal pain, blood in stool, constipation, diarrhea and rectal pain.   Endocrine: Negative for cold intolerance, heat intolerance, polydipsia, polyphagia and polyuria.   Genitourinary:  Negative for dysuria, flank pain, hematuria and urgency.   Musculoskeletal:  Negative for arthralgias, gait problem, myalgias and neck stiffness.   Skin:  Negative for color change and rash.   Allergic/Immunologic: Negative for environmental allergies and food allergies.   Neurological:  Negative for dizziness, seizures, syncope, speech difficulty and headaches.   Hematological:  Negative for adenopathy.   Psychiatric/Behavioral:  Negative for agitation, confusion, decreased concentration, dysphoric mood and sleep disturbance. The patient is not  "nervous/anxious.        Objective   /72 (BP Location: Right arm, Patient Position: Sitting, BP Cuff Size: Large adult)   Pulse 96   Temp 36.3 °C (97.3 °F)   Resp 16   Ht 1.88 m (6' 2\")   Wt 115 kg (254 lb)   SpO2 96%   BMI 32.61 kg/m²     Physical Exam  Vitals reviewed.   Constitutional:       General: He is not in acute distress.     Appearance: He is obese. He is not ill-appearing or diaphoretic.   HENT:      Head: Normocephalic.      Right Ear: Tympanic membrane and external ear normal.      Left Ear: Tympanic membrane and external ear normal.      Nose: Nose normal. No congestion.      Mouth/Throat:      Pharynx: No posterior oropharyngeal erythema.   Eyes:      General:         Right eye: No discharge.         Left eye: No discharge.      Extraocular Movements: Extraocular movements intact.      Conjunctiva/sclera: Conjunctivae normal.      Pupils: Pupils are equal, round, and reactive to light.   Cardiovascular:      Rate and Rhythm: Normal rate and regular rhythm.      Pulses: Normal pulses.      Heart sounds: Normal heart sounds. No murmur heard.  Pulmonary:      Effort: Pulmonary effort is normal. No respiratory distress.      Breath sounds: Normal breath sounds. No wheezing or rales.   Chest:      Chest wall: No tenderness.   Abdominal:      General: Bowel sounds are normal. There is distension.      Palpations: There is no mass.      Tenderness: There is no abdominal tenderness. There is no guarding.   Musculoskeletal:         General: No tenderness. Normal range of motion.      Cervical back: Normal range of motion and neck supple. No tenderness.      Right lower leg: No edema.      Left lower leg: No edema.   Skin:     General: Skin is dry.      Coloration: Skin is not jaundiced.      Findings: No bruising, erythema or rash.   Neurological:      General: No focal deficit present.      Mental Status: He is alert and oriented to person, place, and time. Mental status is at baseline.      " Cranial Nerves: No cranial nerve deficit.      Sensory: No sensory deficit.      Coordination: Coordination normal.      Gait: Gait normal.   Psychiatric:         Mood and Affect: Mood normal.         Thought Content: Thought content normal.         Judgment: Judgment normal.         Assessment/Plan   Problem List Items Addressed This Visit             ICD-10-CM    DM type 2 with diabetic dyslipidemia (Multi) - Primary E11.69, E78.5    Relevant Medications    semaglutide 0.25 mg or 0.5 mg (2 mg/3 mL) pen injector    Other Relevant Orders    POCT glycosylated hemoglobin (Hb A1C) manually resulted (Completed)    POCT Fingerstick Glucose manually resulted (Completed)    Essential hypertension I10    Obstructive sleep apnea G47.33    Morbid obesity (Multi) E66.01     Other Visit Diagnoses         Codes    BMI 32.0-32.9,adult     Z68.32          Scribe Attestation  By signing my name below, I, Yevgeniy Olmedo DO , Scribe   attest that this documentation has been prepared under the direction and in the presence of Yevgeniy Olmedo DO.    Provider Attestation - Scribe documentation    All medical record entries made by the Scribe were at my direction and personally dictated by me. I have reviewed the chart and agree that the record accurately reflects my personal performance of the history, physical exam, discussion and plan.

## 2024-04-16 NOTE — PATIENT INSTRUCTIONS
Follow up in 2 months    Continue current medications and therapy for chronic medical conditions.    Patient was advised importance of proper diet/nutrition in addition adequate hydration. Patient was encouraged moderate exercise program to include 30 minutes daily for 5 days of the week or 150 minutes weekly. Patient will follow-up with us as scheduled.    In office Accu-Chek and hemoglobin A1c    Increase novolin to 20 units     Start ozempic 0.25 mg weekly, samples given

## 2024-04-22 ASSESSMENT — ENCOUNTER SYMPTOMS: DIABETIC ASSOCIATED SYMPTOMS: 0

## 2024-04-22 NOTE — PROGRESS NOTES
Clinical Pharmacy Appointment    Subjective   Patient ID: Jacky Richardson is a 44 y.o. male who presents for Diabetes.    Referring Provider: Yevgeniy Olmedo,      Diabetes  He presents for his follow-up diabetic visit. He has type 2 diabetes mellitus. The initial diagnosis of diabetes was made 10 years ago. His disease course has been fluctuating. There are no hypoglycemic associated symptoms. There are no diabetic associated symptoms. There are no hypoglycemic complications. Risk factors for coronary artery disease include male sex, family history, dyslipidemia, diabetes mellitus, hypertension and obesity. Current diabetic treatments: Novolin N 30 units at night, Novolin R 20 units 2-3 times daily, Ozempic 0.5mg once weekly (provided by samples) Exercise: Patient is a  and gets a lot of movement in with working. (Patient takes his blood sugars daily. States in the morning around 140mg/dL and post prandial around 230mg/dL.) An ACE inhibitor/angiotensin II receptor blocker is being taken. He does not see a podiatrist.Eye exam is current.     Objective     Labs  Lab Results   Component Value Date    BILITOT 0.6 10/13/2023    CALCIUM 9.6 10/13/2023    CO2 26 10/13/2023     10/13/2023    CREATININE 0.95 10/13/2023    GLUCOSE 120 (H) 10/13/2023    ALKPHOS 88 10/13/2023    K 3.8 10/13/2023    PROT 7.8 10/13/2023     10/13/2023    AST 27 10/13/2023    ALT 30 10/13/2023    BUN 10 10/13/2023    ANIONGAP 12 10/13/2023    ALBUMIN 4.3 10/13/2023    GFRMALE >90 06/15/2023     Lab Results   Component Value Date    TRIG 769 (H) 10/13/2023    CHOL 118 10/13/2023    LDLCALC  10/13/2023      Comment:      The calculation of LDL and VLDL are inaccurate when the Triglycerides are greater than 400 mg/dL or when the patient is non-fasting. If LDL measurement is necessary contact the testing laboratory for an alternative LDL assay.                                  Near   Borderline      AGE      Desirable  Optimal     High     High     Very High     0-19 Y     0 - 109     ---    110-129   >/= 130     ----    20-24 Y     0 - 119     ---    120-159   >/= 160     ----      >24 Y     0 -  99   100-129  130-159   160-189     >/=190      HDL 33.1 10/13/2023     Lab Results   Component Value Date    HGBA1C 8.2 (A) 04/16/2024     Current Outpatient Medications on File Prior to Visit   Medication Sig Dispense Refill    allopurinol (Zyloprim) 100 mg tablet TAKE ONE TABLET BY MOUTH TWICE A  tablet 1    atorvastatin (Lipitor) 40 mg tablet Take 1 tablet (40 mg) by mouth once daily. 30 tablet 5    cetirizine (ZyrTEC) 10 mg tablet Take 1 tablet (10 mg) by mouth once daily.      colchicine, gout, 0.6 mg tablet Take 1 tablet (0.6 mg) by mouth once daily. (Patient taking differently: Take 1 tablet (0.6 mg) by mouth once daily as needed.) 90 tablet 1    fenofibrate (Tricor) 145 mg tablet TAKE ONE TABLET BY MOUTH EVERY DAY 30 tablet 5    FreeStyle Aliza reader (FreeStyle Aliza 2 [place]) misc TEST 4-5 TIMES DAILY 1 each 0    FreeStyle Aliaz sensor system (FreeStyle Aliza 2 Sensor) kit TEST 4-5 TIMES DAILY 3 each 5    lisinopriL-hydrochlorothiazide 20-12.5 mg tablet TAKE ONE TABLET BY MOUTH EVERY DAY 30 tablet 5    omega-3 acid ethyl esters (Lovaza) 1 gram capsule TAKE ONE CAPSULE BY MOUTH EVERY DAY 90 capsule 1    semaglutide 0.25 mg or 0.5 mg (2 mg/3 mL) pen injector Inject 0.25 mg under the skin 1 (one) time per week. 2 mL 0     No current facility-administered medications on file prior to visit.      Assessment/Plan   Problem List Items Addressed This Visit             ICD-10-CM    DM type 2 with diabetic dyslipidemia (Multi) E11.69, E78.5     Patients diabetes is fairly controlled as evidenced by the most recent A1c of 8.2% (Goal <7%) on 4/16/2024. This is a slight increase in A1c.  RX CHANGES  CHANGE how you take Novolin N. Patient states he was doing about 30 units at night. Now, take 15 units twice a day.  CONTINUE Novolin R 20 units 3  times a day with meals.  CONTINUE Ozempic 0.5mg under the skin once weekly (medication provided by sample)  Continue checking blood sugars 4 times a day (morning and with meals)  Patient states his morning blood sugars are typically around 140-180mg/dL but throughout the day 180-270mg/dL. Reports never having low blood sugar  Patient does not have prescription insurance. States his job does not offer insurance and he has been too busy to look into insurance. Patient states that he is working on getting Medicaid and should have prescription insurance by the end of the month.  Continue working towards healthy diet and lifestyle.          Follow up with Clinical Pharmacy Team 5/28/2024 at 3:30PM.    Continue all meds under the continuation of care with the referring provider and clinical pharmacy team.    Please reach out to the Clinical Pharmacy Team if there are any further questions.     Verbal consent to manage patient's drug therapy was obtained from patient. They were informed they may decline to participate or withdraw from participation in pharmacy services at any time.    Renee Canada, PharmD  [phone]

## 2024-04-23 ENCOUNTER — TELEMEDICINE (OUTPATIENT)
Dept: PHARMACY | Facility: HOSPITAL | Age: 44
End: 2024-04-23

## 2024-04-23 DIAGNOSIS — E11.69 DM TYPE 2 WITH DIABETIC DYSLIPIDEMIA (MULTI): ICD-10-CM

## 2024-04-23 DIAGNOSIS — E78.5 DM TYPE 2 WITH DIABETIC DYSLIPIDEMIA (MULTI): ICD-10-CM

## 2024-04-23 NOTE — ASSESSMENT & PLAN NOTE
Patients diabetes is fairly controlled as evidenced by the most recent A1c of 8.2% (Goal <7%) on 4/16/2024. This is a slight increase in A1c.  RX CHANGES  CHANGE how you take Novolin N. Patient states he was doing about 30 units at night. Now, take 15 units twice a day.  CONTINUE Novolin R 20 units 3 times a day with meals.  CONTINUE Ozempic 0.5mg under the skin once weekly (medication provided by sample)  Continue checking blood sugars 4 times a day (morning and with meals)  Patient states his morning blood sugars are typically around 140-180mg/dL but throughout the day 180-270mg/dL. Reports never having low blood sugar  Patient does not have prescription insurance. States his job does not offer insurance and he has been too busy to look into insurance. Patient states that he is working on getting Medicaid and should have prescription insurance by the end of the month.  Continue working towards healthy diet and lifestyle.

## 2024-05-16 ENCOUNTER — TELEMEDICINE (OUTPATIENT)
Dept: PRIMARY CARE | Facility: CLINIC | Age: 44
End: 2024-05-16

## 2024-05-16 DIAGNOSIS — E78.2 MIXED HYPERLIPIDEMIA: ICD-10-CM

## 2024-05-16 DIAGNOSIS — E11.69 DM TYPE 2 WITH DIABETIC DYSLIPIDEMIA (MULTI): Primary | ICD-10-CM

## 2024-05-16 DIAGNOSIS — E78.5 DM TYPE 2 WITH DIABETIC DYSLIPIDEMIA (MULTI): Primary | ICD-10-CM

## 2024-05-16 DIAGNOSIS — M10.9 GOUT, UNSPECIFIED CAUSE, UNSPECIFIED CHRONICITY, UNSPECIFIED SITE: ICD-10-CM

## 2024-05-16 DIAGNOSIS — I10 PRIMARY HYPERTENSION: ICD-10-CM

## 2024-05-16 DIAGNOSIS — R53.83 OTHER FATIGUE: ICD-10-CM

## 2024-05-16 PROCEDURE — 99212 OFFICE O/P EST SF 10 MIN: CPT | Performed by: FAMILY MEDICINE

## 2024-05-16 RX ORDER — LISINOPRIL AND HYDROCHLOROTHIAZIDE 12.5; 2 MG/1; MG/1
1 TABLET ORAL DAILY
Qty: 30 TABLET | Refills: 5 | Status: SHIPPED | OUTPATIENT
Start: 2024-05-16

## 2024-05-16 RX ORDER — ALLOPURINOL 100 MG/1
100 TABLET ORAL 2 TIMES DAILY
Qty: 180 TABLET | Refills: 1 | Status: SHIPPED | OUTPATIENT
Start: 2024-05-16

## 2024-05-16 RX ORDER — OMEGA-3-ACID ETHYL ESTERS 1 G/1
1 CAPSULE, LIQUID FILLED ORAL DAILY
Qty: 90 CAPSULE | Refills: 1 | Status: SHIPPED | OUTPATIENT
Start: 2024-05-16

## 2024-05-16 RX ORDER — ATORVASTATIN CALCIUM 40 MG/1
40 TABLET, FILM COATED ORAL DAILY
Qty: 30 TABLET | Refills: 2 | Status: SHIPPED | OUTPATIENT
Start: 2024-05-16 | End: 2024-11-12

## 2024-05-16 ASSESSMENT — ENCOUNTER SYMPTOMS
MYALGIAS: 0
RECTAL PAIN: 0
ADENOPATHY: 0
SPEECH DIFFICULTY: 0
HEMATURIA: 0
HEADACHES: 0
RHINORRHEA: 0
ABDOMINAL PAIN: 0
DIARRHEA: 0
POLYDIPSIA: 0
BLOOD IN STOOL: 0
FATIGUE: 0
FLANK PAIN: 0
DYSPHORIC MOOD: 0
CONSTITUTIONAL NEGATIVE: 1
TROUBLE SWALLOWING: 0
DIZZINESS: 0
POLYPHAGIA: 0
SINUS PAIN: 0
ABDOMINAL DISTENTION: 0
SEIZURES: 0
ARTHRALGIAS: 0
COLOR CHANGE: 0
PALPITATIONS: 0
FEVER: 0
SHORTNESS OF BREATH: 0
CONSTIPATION: 0
DECREASED CONCENTRATION: 0
CONFUSION: 0
PHOTOPHOBIA: 0
CHEST TIGHTNESS: 0
AGITATION: 0
NECK STIFFNESS: 0
STRIDOR: 0
DYSURIA: 0
SLEEP DISTURBANCE: 0
EYE PAIN: 0
SINUS PRESSURE: 0
SORE THROAT: 0
ACTIVITY CHANGE: 0
APPETITE CHANGE: 0
NERVOUS/ANXIOUS: 0
COUGH: 0

## 2024-05-16 NOTE — PATIENT INSTRUCTIONS
Follow up in 3 months    Continue current medications and therapy for chronic medical conditions.    Patient was advised importance of proper diet/nutrition in addition adequate hydration. Patient was encouraged moderate exercise program to include 30 minutes daily for 5 days of the week or 150 minutes weekly. Patient will follow-up with us as scheduled.    Ozempic sample set aside

## 2024-05-16 NOTE — PROGRESS NOTES
Subjective   Patient ID: Jacky Richardson is a 44 y.o. male who presents for Diabetes.    Patient states doing well with the medication and his sugar levels are around 130. Patient states would need a sample of ozempic.    Patient denies any symptoms or concerns today.    Diabetes  He presents for his follow-up diabetic visit. He has type 2 diabetes mellitus. Pertinent negatives for hypoglycemia include no confusion, dizziness, headaches, nervousness/anxiousness, seizures or speech difficulty. Pertinent negatives for diabetes include no chest pain, no fatigue, no polydipsia, no polyphagia and no polyuria. There are no hypoglycemic complications. There are no diabetic complications. Risk factors for coronary artery disease include diabetes mellitus.        Review of Systems   Constitutional: Negative.  Negative for activity change, appetite change, fatigue and fever.   HENT:  Negative for congestion, dental problem, ear discharge, ear pain, mouth sores, rhinorrhea, sinus pressure, sinus pain, sore throat, tinnitus and trouble swallowing.    Eyes:  Negative for photophobia, pain and visual disturbance.   Respiratory:  Negative for cough, chest tightness, shortness of breath and stridor.    Cardiovascular:  Negative for chest pain and palpitations.   Gastrointestinal:  Negative for abdominal distention, abdominal pain, blood in stool, constipation, diarrhea and rectal pain.   Endocrine: Negative for cold intolerance, heat intolerance, polydipsia, polyphagia and polyuria.   Genitourinary:  Negative for dysuria, flank pain, hematuria and urgency.   Musculoskeletal:  Negative for arthralgias, gait problem, myalgias and neck stiffness.   Skin:  Negative for color change and rash.   Allergic/Immunologic: Negative for environmental allergies and food allergies.   Neurological:  Negative for dizziness, seizures, syncope, speech difficulty and headaches.   Hematological:  Negative for adenopathy.   Psychiatric/Behavioral:   Negative for agitation, confusion, decreased concentration, dysphoric mood and sleep disturbance. The patient is not nervous/anxious.        Objective   There were no vitals taken for this visit.    Physical Exam  Constitutional:       Appearance: He is obese.   Neurological:      Mental Status: He is alert and oriented to person, place, and time.   Psychiatric:         Mood and Affect: Mood normal.         Thought Content: Thought content normal.       Constitutional: Well developed, well nourished, alert and in no acute distress   Psychiatric: Mood calm and affect normal    Telephone Visit - Audio Communication Only     Assessment/Plan   Problem List Items Addressed This Visit             ICD-10-CM    Mixed hyperlipidemia E78.2    Relevant Medications    atorvastatin (Lipitor) 40 mg tablet    DM type 2 with diabetic dyslipidemia (Multi) - Primary E11.69, E78.5    Relevant Medications    semaglutide 0.25 mg or 0.5 mg (2 mg/3 mL) pen injector    Gout M10.9    Relevant Medications    allopurinol (Zyloprim) 100 mg tablet     Other Visit Diagnoses         Codes    Other fatigue     R53.83    Relevant Medications    omega-3 acid ethyl esters (Lovaza) 1 gram capsule    Primary hypertension     I10    Relevant Medications    lisinopriL-hydrochlorothiazide 20-12.5 mg tablet          Scribe Attestation  By signing my name below, I, Yevgeniy Olmedo DO , Scribe   attest that this documentation has been prepared under the direction and in the presence of Yevgeniy Olmedo DO.    Provider Attestation - Scribe documentation    All medical record entries made by the Scribe were at my direction and personally dictated by me. I have reviewed the chart and agree that the record accurately reflects my personal performance of the history, physical exam, discussion and plan.

## 2024-05-27 ASSESSMENT — ENCOUNTER SYMPTOMS: DIABETIC ASSOCIATED SYMPTOMS: 0

## 2024-05-28 ENCOUNTER — TELEMEDICINE (OUTPATIENT)
Dept: PHARMACY | Facility: HOSPITAL | Age: 44
End: 2024-05-28

## 2024-05-28 DIAGNOSIS — E78.5 DM TYPE 2 WITH DIABETIC DYSLIPIDEMIA (MULTI): Primary | ICD-10-CM

## 2024-05-28 DIAGNOSIS — E11.69 DM TYPE 2 WITH DIABETIC DYSLIPIDEMIA (MULTI): Primary | ICD-10-CM

## 2024-05-28 NOTE — ASSESSMENT & PLAN NOTE
Patients diabetes is fairly controlled as evidenced by the most recent A1c of 8.2% (Goal <7%) on 4/16/2024. This is a slight increase in A1c.  RX CHANGES  CHANGE how you take Novolin N. Patient states he was doing about 30 units at night. Now, take 15 units twice a day.  CONTINUE Novolin R 20 units 3 times a day with meals.  CONTINUE Ozempic 0.5mg under the skin once weekly (medication provided by sample)  Continue checking blood sugars 4 times a day (morning and with meals)  Patient states his morning blood sugars are typically around 140-180mg/dL but throughout the day 180-270mg/dL. Reports never having low blood sugar  Patient does not have prescription insurance. States he is eligible to get insurance with his job after working for 90 days. He should be eligible in about 2 months.  Continue working towards healthy diet and lifestyle.

## 2024-05-28 NOTE — PROGRESS NOTES
Clinical Pharmacy Appointment    Subjective   Patient ID: Jacky Richardson is a 44 y.o. male who presents for Diabetes.    Referring Provider: Yevgeniy Olmedo,      Diabetes  He presents for his follow-up diabetic visit. He has type 2 diabetes mellitus. The initial diagnosis of diabetes was made 10 years ago. His disease course has been fluctuating. There are no hypoglycemic associated symptoms. There are no diabetic associated symptoms. There are no hypoglycemic complications. Risk factors for coronary artery disease include male sex, family history, dyslipidemia, diabetes mellitus, hypertension and obesity. Current diabetic treatments: Novolin N 30 units at night, Novolin R 15-20 units 2-3 times daily, Ozempic 0.5mg once weekly (provided by samples) Exercise: Patient is a  and gets a lot of movement in with working. (Patient takes his blood sugars daily. States in the morning around 140mg/dL and post prandial around 230mg/dL.) An ACE inhibitor/angiotensin II receptor blocker is being taken. He does not see a podiatrist.Eye exam is current.     Objective     Labs  Lab Results   Component Value Date    BILITOT 0.6 10/13/2023    CALCIUM 9.6 10/13/2023    CO2 26 10/13/2023     10/13/2023    CREATININE 0.95 10/13/2023    GLUCOSE 120 (H) 10/13/2023    ALKPHOS 88 10/13/2023    K 3.8 10/13/2023    PROT 7.8 10/13/2023     10/13/2023    AST 27 10/13/2023    ALT 30 10/13/2023    BUN 10 10/13/2023    ANIONGAP 12 10/13/2023    ALBUMIN 4.3 10/13/2023    GFRMALE >90 06/15/2023     Lab Results   Component Value Date    TRIG 769 (H) 10/13/2023    CHOL 118 10/13/2023    LDLCALC  10/13/2023      Comment:      The calculation of LDL and VLDL are inaccurate when the Triglycerides are greater than 400 mg/dL or when the patient is non-fasting. If LDL measurement is necessary contact the testing laboratory for an alternative LDL assay.                                  Near   Borderline      AGE      Desirable  Optimal     High     High     Very High     0-19 Y     0 - 109     ---    110-129   >/= 130     ----    20-24 Y     0 - 119     ---    120-159   >/= 160     ----      >24 Y     0 -  99   100-129  130-159   160-189     >/=190      HDL 33.1 10/13/2023     Lab Results   Component Value Date    HGBA1C 8.2 (A) 04/16/2024     Current Outpatient Medications on File Prior to Visit   Medication Sig Dispense Refill    allopurinol (Zyloprim) 100 mg tablet Take 1 tablet (100 mg) by mouth 2 times a day. 180 tablet 1    atorvastatin (Lipitor) 40 mg tablet Take 1 tablet (40 mg) by mouth once daily. 30 tablet 2    cetirizine (ZyrTEC) 10 mg tablet Take 1 tablet (10 mg) by mouth once daily.      colchicine, gout, 0.6 mg tablet Take 1 tablet (0.6 mg) by mouth once daily. (Patient taking differently: Take 1 tablet (0.6 mg) by mouth once daily as needed.) 90 tablet 1    fenofibrate (Tricor) 145 mg tablet TAKE ONE TABLET BY MOUTH EVERY DAY 30 tablet 5    FreeStyle Aliza reader (FreeStyle Aliza 2 [place]) misc TEST 4-5 TIMES DAILY 1 each 0    FreeStyle Aliza sensor system (FreeStyle Aliza 2 Sensor) kit TEST 4-5 TIMES DAILY 3 each 5    lisinopriL-hydrochlorothiazide 20-12.5 mg tablet Take 1 tablet by mouth once daily. 30 tablet 5    omega-3 acid ethyl esters (Lovaza) 1 gram capsule Take 1 capsule (1 g) by mouth once daily. 90 capsule 1    semaglutide 0.25 mg or 0.5 mg (2 mg/3 mL) pen injector Inject 0.25 mg under the skin 1 (one) time per week. 2 mL 0     No current facility-administered medications on file prior to visit.      Assessment/Plan   Problem List Items Addressed This Visit             ICD-10-CM    DM type 2 with diabetic dyslipidemia (Multi) - Primary E11.69, E78.5     Patients diabetes is fairly controlled as evidenced by the most recent A1c of 8.2% (Goal <7%) on 4/16/2024. This is a slight increase in A1c.  RX CHANGES  CHANGE how you take Novolin N. Patient states he was doing about 30 units at night. Now, take 15 units twice a  day.  CONTINUE Novolin R 20 units 3 times a day with meals.  CONTINUE Ozempic 0.5mg under the skin once weekly (medication provided by sample)  Continue checking blood sugars 4 times a day (morning and with meals)  Patient states his morning blood sugars are typically around 140-180mg/dL but throughout the day 180-270mg/dL. Reports never having low blood sugar  Patient does not have prescription insurance. States he is eligible to get insurance with his job after working for 90 days. He should be eligible in about 2 months.  Continue working towards healthy diet and lifestyle.         Relevant Orders    Follow Up In [facility] - Pharmacy     Follow up with Clinical Pharmacy Team 8/6/2024 at 4:00PM.    Continue all meds under the continuation of care with the referring provider and clinical pharmacy team.    Please reach out to the Clinical Pharmacy Team if there are any further questions.     Verbal consent to manage patient's drug therapy was obtained from patient. They were informed they may decline to participate or withdraw from participation in pharmacy services at any time.    Renee Canada, PharmD  [phone]

## 2024-06-18 ENCOUNTER — TELEPHONE (OUTPATIENT)
Dept: PRIMARY CARE | Facility: CLINIC | Age: 44
End: 2024-06-18

## 2024-06-18 NOTE — TELEPHONE ENCOUNTER
PT REQUESTS A NOTE FOR JURY DUTY FROM [facility] STATING HE HAS DAYTIME SLEEP [facility] AND IS UNFIT FOR JURY DUTY.    FAX TO:  [phone]    PLEASE ADVISE AND CALL PT

## 2024-08-06 ENCOUNTER — APPOINTMENT (OUTPATIENT)
Dept: PHARMACY | Facility: HOSPITAL | Age: 44
End: 2024-08-06

## 2024-08-07 ENCOUNTER — TELEMEDICINE (OUTPATIENT)
Dept: PHARMACY | Facility: HOSPITAL | Age: 44
End: 2024-08-07

## 2024-08-07 DIAGNOSIS — E11.69 DM TYPE 2 WITH DIABETIC DYSLIPIDEMIA (MULTI): ICD-10-CM

## 2024-08-07 DIAGNOSIS — E78.5 DM TYPE 2 WITH DIABETIC DYSLIPIDEMIA (MULTI): ICD-10-CM

## 2024-08-07 ASSESSMENT — ENCOUNTER SYMPTOMS: DIABETIC ASSOCIATED SYMPTOMS: 0

## 2024-08-07 NOTE — ASSESSMENT & PLAN NOTE
Patient's goal A1c is < 7%.  Is pt at goal? No, most recent A1C of 8.2% on 2024. Patient will be due for an updated A1c once he has insurance again.  Patient reports checking blood glucose twice daily with levels around 150 and up to 190 after lunch or dinner. He does not recall any readings less than 70. Counseled on symptoms of hypoglycemia.       Rationale for plan: Patient is not at goal A1C, but recently lost insurance coverage. He anticipates getting new insurance next month. Continue with insulin purchased from [facility] for now and reassess at next visit.    Medication Changes:  CONTINUE  Novolin R 15 units with meals  Ozempic 0.5 mg once weekly (via samples)  CHANGE  Novolin N to 15 units twice daily    Diabetes Education  Rule of 15: eating ~15 g of carbs when BG less than 80 (half cup juice, 3-4 glucose tabs).  Recognize symptoms of high and low blood sugar.   Eat a realistic healthy diet consisting of fruits, vegetables, fiber, protein food choices on a regular basis and be aware of portion/serving sizes. Reduce carbohydrate consumption and always consume with protein and fat. Avoid foods high in saturated/trans fat, high salt content, and sweets and beverages with added sugars.  Limit alcohol consumption; alcohol may affect your blood sugar and cause hypoglycemia.   Stay active and incorporate ~30 mins of exercise into your daily routine to manage your weight and increase the body's acceptance of insulin.    PATIENT GOALS   Fasting B - 130 mg/dL 2-HR Postprandial BG:   Less than 180 mg/dL A1c:   Less than 7.0 %     Ozempic Education:   Counseled patient on Ozempic MOA, expectations, side effects, duration of therapy, administration, and monitoring parameters.  Provided detailed dosing and administration counseling to ensure proper technique.   Reviewed Ozempic titration schedule, starting with 0.25 mg once weekly for 4 weeks, then continuing on 0.5 mg once weekly. Patient verbalized  understanding.  Counseled patient on the benefits of GLP-1ra, such as cardiovascular risk reduction, glycemic control, and weight loss potential.  Reviewed storage requirements of Ozempic when not in use, and when to administer the medication if a dose is missed.  Advised patient that they may experience improved satiety after meals and portion sizes of meals may be reduced as doses of Ozempic increase.  Counseled patient to avoid foods that are fatty/oily as this may precipitate the nausea/GI upset that may occur with new start Ozempic.

## 2024-08-07 NOTE — PROGRESS NOTES
Clinical Pharmacy Appointment    Subjective   Patient ID: Jacky Richardson is a 44 y.o. male who presents for Diabetes.    Referring Provider: Yevgeniy Olmedo,      Diabetes  He presents for his follow-up diabetic visit. He has type 2 diabetes mellitus. The initial diagnosis of diabetes was made 10 years ago. His disease course has been fluctuating. There are no hypoglycemic associated symptoms. There are no diabetic associated symptoms. There are no hypoglycemic complications. Risk factors for coronary artery disease include male sex, family history, dyslipidemia, diabetes mellitus, hypertension and obesity. Current diabetic treatments: Novolin N 30 units at night, Novolin R 15 units 2-3 times daily, Ozempic 0.5mg once weekly (provided by samples) Exercise: Patient is a  and gets a lot of movement in with working. (Patient takes his blood sugars twice daily with readings usually around 150 and up to 190 after lunch/dinner) An ACE inhibitor/angiotensin II receptor blocker is being taken. He does not see a podiatrist.Eye exam is current.     Objective     Labs  Lab Results   Component Value Date    BILITOT 0.6 10/13/2023    CALCIUM 9.6 10/13/2023    CO2 26 10/13/2023     10/13/2023    CREATININE 0.95 10/13/2023    GLUCOSE 120 (H) 10/13/2023    ALKPHOS 88 10/13/2023    K 3.8 10/13/2023    PROT 7.8 10/13/2023     10/13/2023    AST 27 10/13/2023    ALT 30 10/13/2023    BUN 10 10/13/2023    ANIONGAP 12 10/13/2023    ALBUMIN 4.3 10/13/2023    GFRMALE >90 06/15/2023     Lab Results   Component Value Date    TRIG 769 (H) 10/13/2023    CHOL 118 10/13/2023    LDLCALC  10/13/2023      Comment:      The calculation of LDL and VLDL are inaccurate when the Triglycerides are greater than 400 mg/dL or when the patient is non-fasting. If LDL measurement is necessary contact the testing laboratory for an alternative LDL assay.                                  Near   Borderline      AGE      Desirable  Optimal     High     High     Very High     0-19 Y     0 - 109     ---    110-129   >/= 130     ----    20-24 Y     0 - 119     ---    120-159   >/= 160     ----      >24 Y     0 -  99   100-129  130-159   160-189     >/=190      HDL 33.1 10/13/2023     Lab Results   Component Value Date    HGBA1C 8.2 (A) 04/16/2024     Current Outpatient Medications on File Prior to Visit   Medication Sig Dispense Refill    allopurinol (Zyloprim) 100 mg tablet Take 1 tablet (100 mg) by mouth 2 times a day. 180 tablet 1    atorvastatin (Lipitor) 40 mg tablet Take 1 tablet (40 mg) by mouth once daily. 30 tablet 2    cetirizine (ZyrTEC) 10 mg tablet Take 1 tablet (10 mg) by mouth once daily.      colchicine, gout, 0.6 mg tablet Take 1 tablet (0.6 mg) by mouth once daily. (Patient taking differently: Take 1 tablet (0.6 mg) by mouth once daily as needed.) 90 tablet 1    fenofibrate (Tricor) 145 mg tablet TAKE ONE TABLET BY MOUTH EVERY DAY 30 tablet 5    FreeStyle Aliza reader (FreeStyle Aliza 2 [place]) misc TEST 4-5 TIMES DAILY 1 each 0    FreeStyle Aliza sensor system (FreeStyle Aliza 2 Sensor) kit TEST 4-5 TIMES DAILY 3 each 5    insulin NPH, Isophane, (HumuLIN N,NovoLIN N) 100 unit/mL injection Inject 30 Units under the skin once daily at bedtime. Take as directed per insulin instructions.      insulin regular (HumuLIN R,NovoLIN R) 100 unit/mL injection Inject 15 Units under the skin 3 times daily (morning, midday, late afternoon). Take as directed per insulin instructions.      lisinopriL-hydrochlorothiazide 20-12.5 mg tablet Take 1 tablet by mouth once daily. 30 tablet 5    omega-3 acid ethyl esters (Lovaza) 1 gram capsule Take 1 capsule (1 g) by mouth once daily. 90 capsule 1    semaglutide 0.25 mg or 0.5 mg (2 mg/3 mL) pen injector Inject 0.25 mg under the skin 1 (one) time per week. 2 mL 0     No current facility-administered medications on file prior to visit.      Assessment/Plan   Problem List Items Addressed This Visit              ICD-10-CM    DM type 2 with diabetic dyslipidemia (Multi) E11.69, E78.5     Patient's goal A1c is < 7%.  Is pt at goal? No, most recent A1C of 8.2% on 2024. Patient will be due for an updated A1c once he has insurance again.  Patient reports checking blood glucose twice daily with levels around 150 and up to 190 after lunch or dinner. He does not recall any readings less than 70. Counseled on symptoms of hypoglycemia.       Rationale for plan: Patient is not at goal A1C, but recently lost insurance coverage. He anticipates getting new insurance next month. Continue with insulin purchased from [facility] for now and reassess at next visit.    Medication Changes:  CONTINUE  Novolin R 15 units with meals  Ozempic 0.5 mg once weekly (via samples)  CHANGE  Novolin N to 15 units twice daily    Diabetes Education  Rule of 15: eating ~15 g of carbs when BG less than 80 (half cup juice, 3-4 glucose tabs).  Recognize symptoms of high and low blood sugar.   Eat a realistic healthy diet consisting of fruits, vegetables, fiber, protein food choices on a regular basis and be aware of portion/serving sizes. Reduce carbohydrate consumption and always consume with protein and fat. Avoid foods high in saturated/trans fat, high salt content, and sweets and beverages with added sugars.  Limit alcohol consumption; alcohol may affect your blood sugar and cause hypoglycemia.   Stay active and incorporate ~30 mins of exercise into your daily routine to manage your weight and increase the body's acceptance of insulin.    PATIENT GOALS   Fasting B - 130 mg/dL 2-HR Postprandial BG:   Less than 180 mg/dL A1c:   Less than 7.0 %     Ozempic Education:   Counseled patient on Ozempic MOA, expectations, side effects, duration of therapy, administration, and monitoring parameters.  Provided detailed dosing and administration counseling to ensure proper technique.   Reviewed Ozempic titration schedule, starting with 0.25 mg once weekly for  4 weeks, then continuing on 0.5 mg once weekly. Patient verbalized understanding.  Counseled patient on the benefits of GLP-1ra, such as cardiovascular risk reduction, glycemic control, and weight loss potential.  Reviewed storage requirements of Ozempic when not in use, and when to administer the medication if a dose is missed.  Advised patient that they may experience improved satiety after meals and portion sizes of meals may be reduced as doses of Ozempic increase.  Counseled patient to avoid foods that are fatty/oily as this may precipitate the nausea/GI upset that may occur with new start Ozempic.          Relevant Orders    Follow Up In [facility] - Pharmacy     Follow up with Clinical Pharmacy Team 8/28/2024 at 4:00PM.    Continue all meds under the continuation of care with the referring provider and clinical pharmacy team.    Please reach out to the Clinical Pharmacy Team if there are any further questions.     Verbal consent to manage patient's drug therapy was obtained from patient. They were informed they may decline to participate or withdraw from participation in pharmacy services at any time.    Pat Marie, PharmD  Renee Canada, MichelleD  [phone]

## 2024-08-28 ENCOUNTER — APPOINTMENT (OUTPATIENT)
Dept: PHARMACY | Facility: HOSPITAL | Age: 44
End: 2024-08-28

## 2024-08-28 DIAGNOSIS — E11.69 DM TYPE 2 WITH DIABETIC DYSLIPIDEMIA (MULTI): ICD-10-CM

## 2024-08-28 DIAGNOSIS — E78.5 DM TYPE 2 WITH DIABETIC DYSLIPIDEMIA (MULTI): ICD-10-CM

## 2024-08-28 NOTE — ASSESSMENT & PLAN NOTE
Patient's goal A1c is < 7%.  Is pt at goal? No, 8.2% 2024  Patient's SMBGs are 150s, 180.     Rationale for plan: Continue current medications, no change in plan due to patient's uninsured status. Pt states he will have medicaid soon.    Medication Changes:  CONTINUE:  Novolin R 15 units with meals  Ozempic 0.5 mg once weekly (via samples)  Novolin N 15 units twice daily     Future Considerations:  Recommend starting a long-acting insulin once patient has insurance    Monitoring and Education:  Diabetes Education  Rule of 15: eating ~15 g of carbs when BG less than 80 (half cup juice, 3-4 glucose tabs).  Recognize symptoms of high and low blood sugar.   Eat a realistic healthy diet consisting of fruits, vegetables, fiber, protein food choices on a regular basis and be aware of portion/serving sizes. Reduce carbohydrate consumption and always consume with protein and fat. Avoid foods high in saturated/trans fat, high salt content, and sweets and beverages with added sugars.  Limit alcohol consumption; alcohol may affect your blood sugar and cause hypoglycemia.   Stay active and incorporate ~30 mins of exercise into your daily routine to manage your weight and increase the body's acceptance of insulin.    PATIENT GOALS   Fasting B - 130 mg/dL 2-HR Postprandial BG:   Less than 180 mg/dL A1c:   Less than 7.0 %         23-Aug-2017

## 2024-08-28 NOTE — PROGRESS NOTES
Clinical Pharmacy Appointment    Patient ID: Jacky Richardson is a 44 y.o. male who presents for Diabetes.    Pt is here for Follow Up appointment.     Referring Provider: Yevgeniy Olmedo DO  PCP: Yevgeniy Olmedo DO   Last visit with PCP: 4/16/24   Next visit with PCP: none scheduled, recommend      Subjective     HPI     Pt's last A1c was not at goal, 8.2% on 4/16/24  Lab Results   Component Value Date    HGBA1C 8.2 (A) 04/16/2024       PMH: HLD, pancreatitis, HTN    BP Readings from Last 3 Encounters:   04/16/24 136/72   01/16/24 124/86   10/17/23 136/86        DIABETES MELLITUS TYPE 2:    Diagnosed with diabetes:  10 years. Known diabetic complications: none.  Does patient follow with Endocrinology: no  Last optometry exam: unknown  Most recent visit in Podiatry: no-- patient denies sores or cuts on feet today      Current diabetic medications include:  Novolin R 15 units with meals  Ozempic 0.5 mg once weekly (via samples)  Novolin N 15 units twice daily     Past diabetic medications include:  Jardiance  Lispro  Lantus    Clarifications to above regimen:   Adverse Effects: none reported    Glucose Readings:  Glucometer/CGM Type: [employer] Aliza  Patient tests BG 2 times per day    Current home BG readings: 180 AM reading, usually around 150    Previous home BG readings: usually around 150, up to 190 after meals    Any episodes of hypoglycemia? No,   .  Did patient treat episode of hypoglycemia appropriately? N/A  Does the patient have a prescription for ready-to-use Glucagon? No    Does pt have proteinuria? N/A- no lab result    Lifestyle:    Physical Activity: Patient is a  and gets a lot of movement in with working.     Secondary Prevention:  Statin? Yes- Lipitor 40mg- take 1 tab a day  ACE-I/ARB? Yes- Lisinopril/hydrochlorothiazide 20/12.5mg- take 1 tab a day  Aspirin? No    Pertinent PMH Review:  PMH of Pancreatitis: Yes  PMH of Retinopathy: No  PMH of Urinary Tract Infections: No  PMH of MTC: No        Medication Reconciliation:  Changed: Novolin N 10 units AM    Drug Interactions  No relevant drug interactions were noted.    Medication System Management  [facility], may have filled [facility] while waiting for insurance coverage  Adherence/Organization: pt will get another ozempic sample from office  Affordability/Accessibility: uninsured      Objective   No Known Allergies  Social History     Social History Narrative    Not on file      Medication Review  Current Outpatient Medications   Medication Instructions    allopurinol (ZYLOPRIM) 100 mg, oral, 2 times daily    atorvastatin (LIPITOR) 40 mg, oral, Daily    cetirizine (ZyrTEC) 10 mg tablet 1 tablet, oral, Daily    colchicine 0.6 mg, oral, Daily    fenofibrate (Tricor) 145 mg tablet TAKE ONE TABLET BY MOUTH EVERY DAY    FreeStyle Aliza reader (FreeStyle Aliza 2 [place]) misc TEST 4-5 TIMES DAILY    FreeStyle Aliza sensor system (FreeStyle Aliza 2 Sensor) kit TEST 4-5 TIMES DAILY    insulin NPH (Isophane) (HUMULIN N,NOVOLIN N) 10 Units, subcutaneous, Nightly, Take as directed per insulin instructions.    insulin regular (HUMULIN R,NOVOLIN R) 15 Units, subcutaneous, 3 times daily (morning, midday, late afternoon), Take as directed per insulin instructions.    lisinopriL-hydrochlorothiazide 20-12.5 mg tablet 1 tablet, oral, Daily    omega-3 acid ethyl esters (LOVAZA) 1 g, oral, Daily    semaglutide 0.25 mg, subcutaneous, Once Weekly      Vitals  BP Readings from Last 2 Encounters:   04/16/24 136/72   01/16/24 124/86     BMI Readings from Last 1 Encounters:   04/16/24 32.61 kg/m²      Labs  A1C  Lab Results   Component Value Date    HGBA1C 8.2 (A) 04/16/2024    HGBA1C 7.5 (A) 01/16/2024    HGBA1C 6.9 (H) 10/13/2023     BMP  Lab Results   Component Value Date    CALCIUM 9.6 10/13/2023     10/13/2023    K 3.8 10/13/2023    CO2 26 10/13/2023     10/13/2023    BUN 10 10/13/2023    CREATININE 0.95 10/13/2023    EGFR  >90 10/13/2023     LFTs  Lab Results   Component Value Date    ALT 30 10/13/2023    AST 27 10/13/2023    ALKPHOS 88 10/13/2023    BILITOT 0.6 10/13/2023     FLP  Lab Results   Component Value Date    TRIG 769 (H) 10/13/2023    CHOL 118 10/13/2023    LDLF - 06/15/2023    LDLCALC  10/13/2023      Comment:      The calculation of LDL and VLDL are inaccurate when the Triglycerides are greater than 400 mg/dL or when the patient is non-fasting. If LDL measurement is necessary contact the testing laboratory for an alternative LDL assay.                                  Near   Borderline      AGE      Desirable  Optimal    High     High     Very High     0-19 Y     0 - 109     ---    110-129   >/= 130     ----    20-24 Y     0 - 119     ---    120-159   >/= 160     ----      >24 Y     0 -  99   100-129  130-159   160-189     >/=190      HDL 33.1 10/13/2023     Urine Microalbumin  Lab Results   Component Value Date    MICROALBCREA 5.4 04/22/2021     Weight Management  Wt Readings from Last 3 Encounters:   04/16/24 115 kg (254 lb)   01/16/24 124 kg (273 lb 6.4 oz)   10/17/23 122 kg (269 lb)      There is no height or weight on file to calculate BMI.     Assessment/Plan   Problem List Items Addressed This Visit       DM type 2 with diabetic dyslipidemia (Multi)     Patient's goal A1c is < 7%.  Is pt at goal? No, 8.2% 4/2024  Patient's SMBGs are 150s, 180.     Rationale for plan: Continue current medications, no change in plan due to patient's uninsured status. Pt states he will have medicaid soon.    Medication Changes:  CONTINUE:  Novolin R 15 units with meals  Ozempic 0.5 mg once weekly (via samples)  Novolin N 15 units twice daily     Future Considerations:  Recommend starting a long-acting insulin once patient has insurance    Monitoring and Education:  Diabetes Education  Rule of 15: eating ~15 g of carbs when BG less than 80 (half cup juice, 3-4 glucose tabs).  Recognize symptoms of high and low blood sugar.   Eat a  realistic healthy diet consisting of fruits, vegetables, fiber, protein food choices on a regular basis and be aware of portion/serving sizes. Reduce carbohydrate consumption and always consume with protein and fat. Avoid foods high in saturated/trans fat, high salt content, and sweets and beverages with added sugars.  Limit alcohol consumption; alcohol may affect your blood sugar and cause hypoglycemia.   Stay active and incorporate ~30 mins of exercise into your daily routine to manage your weight and increase the body's acceptance of insulin.    PATIENT GOALS   Fasting B - 130 mg/dL 2-HR Postprandial BG:   Less than 180 mg/dL A1c:   Less than 7.0 %                  Clinical Pharmacist follow-up: 24 @4pm, Telehealth visit    Continue all meds under the continuation of care with the referring provider and clinical pharmacy team.    Thank you,  Charline Serrano  Clinical Pharmacist  [phone]    Verbal consent to manage patient's drug therapy was obtained from the patient. They were informed they may decline to participate or withdraw from participation in pharmacy services at any time.

## 2024-09-25 ENCOUNTER — APPOINTMENT (OUTPATIENT)
Dept: PHARMACY | Facility: HOSPITAL | Age: 44
End: 2024-09-25

## 2024-10-04 ENCOUNTER — HOSPITAL ENCOUNTER (EMERGENCY)
Age: 44
Discharge: HOME | End: 2024-10-04

## 2024-10-04 VITALS
OXYGEN SATURATION: 98 % | WEIGHT: 255 LBS | DIASTOLIC BLOOD PRESSURE: 100 MMHG | HEART RATE: 94 BPM | RESPIRATION RATE: 16 BRPM | TEMPERATURE: 98.4 F | HEIGHT: 74 IN | BODY MASS INDEX: 32.73 KG/M2 | SYSTOLIC BLOOD PRESSURE: 155 MMHG

## 2024-10-04 DIAGNOSIS — S91.331A PUNCTURE WOUND OF RIGHT FOOT, INITIAL ENCOUNTER: Primary | ICD-10-CM

## 2024-10-04 PROCEDURE — 90471 IMMUNIZATION ADMIN: CPT

## 2024-10-04 PROCEDURE — 90715 TDAP VACCINE 7 YRS/> IM: CPT

## 2024-10-04 PROCEDURE — 99284 EMERGENCY DEPT VISIT MOD MDM: CPT

## 2024-10-04 PROCEDURE — 6360000002 HC RX W HCPCS

## 2024-10-04 PROCEDURE — 6370000000 HC RX 637 (ALT 250 FOR IP)

## 2024-10-04 RX ORDER — LEVOFLOXACIN 750 MG/1
750 TABLET, FILM COATED ORAL ONCE
Status: COMPLETED | OUTPATIENT
Start: 2024-10-04 | End: 2024-10-04

## 2024-10-04 RX ORDER — BACITRACIN ZINC AND POLYMYXIN B SULFATE 500; 1000 [USP'U]/G; [USP'U]/G
OINTMENT TOPICAL
Qty: 28.4 G | Refills: 0 | Status: SHIPPED | OUTPATIENT
Start: 2024-10-04 | End: 2024-10-11

## 2024-10-04 RX ORDER — LEVOFLOXACIN 750 MG/1
750 TABLET, FILM COATED ORAL DAILY
Qty: 7 TABLET | Refills: 0 | Status: SHIPPED | OUTPATIENT
Start: 2024-10-04 | End: 2024-10-11

## 2024-10-04 RX ADMIN — LEVOFLOXACIN 750 MG: 750 TABLET, FILM COATED ORAL at 16:14

## 2024-10-04 RX ADMIN — TETANUS TOXOID, REDUCED DIPHTHERIA TOXOID AND ACELLULAR PERTUSSIS VACCINE, ADSORBED 0.5 ML: 5; 2.5; 8; 8; 2.5 SUSPENSION INTRAMUSCULAR at 16:14

## 2024-10-04 ASSESSMENT — PAIN DESCRIPTION - PAIN TYPE: TYPE: ACUTE PAIN

## 2024-10-04 ASSESSMENT — PAIN SCALES - GENERAL: PAINLEVEL_OUTOF10: 4

## 2024-10-04 ASSESSMENT — LIFESTYLE VARIABLES
HOW MANY STANDARD DRINKS CONTAINING ALCOHOL DO YOU HAVE ON A TYPICAL DAY: 3 OR 4
HOW OFTEN DO YOU HAVE A DRINK CONTAINING ALCOHOL: 4 OR MORE TIMES A WEEK

## 2024-10-04 ASSESSMENT — PAIN DESCRIPTION - ORIENTATION: ORIENTATION: RIGHT

## 2024-10-04 ASSESSMENT — PAIN DESCRIPTION - DESCRIPTORS: DESCRIPTORS: ACHING

## 2024-10-04 ASSESSMENT — PAIN DESCRIPTION - LOCATION: LOCATION: FOOT

## 2024-10-04 NOTE — ED TRIAGE NOTES
Patient arrived to ER by private vehicle with family.   Patient c/o right foot pain. Patient states stepped on a edilma nail yesterday.  Patient last tetanus greater than 10 years.

## 2024-10-04 NOTE — ED PROVIDER NOTES
Apply topically 2 times daily.    LEVOFLOXACIN (LEVAQUIN) 750 MG TABLET    Take 1 tablet by mouth daily for 7 days     Controlled Substances Monitoring:          No data to display                (Please note that portions of this note were completed with a voice recognition program.  Efforts were made to edit the dictations but occasionally words are mis-transcribed.)    LILLIE Chahal (electronically signed)    Supervising Attending Physician: Dr. Duvall.     Wendy Ruiz NP-C  10/04/24 1614       Wendy Ruiz NP-C  10/04/24 161

## 2024-10-04 NOTE — DISCHARGE INSTRUCTIONS
Take/apply medications as directed.    Follow-up with PCP.     Return to ED if any new, or worsening symptoms.

## 2024-10-22 ENCOUNTER — HOSPITAL ENCOUNTER (EMERGENCY)
Age: 44
Discharge: LEFT BEFORE DISCHARGE | End: 2024-10-22

## 2024-10-22 VITALS
RESPIRATION RATE: 16 BRPM | HEART RATE: 87 BPM | BODY MASS INDEX: 34.65 KG/M2 | WEIGHT: 270 LBS | OXYGEN SATURATION: 97 % | HEIGHT: 74 IN | TEMPERATURE: 99.1 F | SYSTOLIC BLOOD PRESSURE: 150 MMHG | DIASTOLIC BLOOD PRESSURE: 84 MMHG

## 2024-10-22 LAB
EKG ATRIAL RATE: 90 BPM
EKG P AXIS: 47 DEGREES
EKG P-R INTERVAL: 138 MS
EKG Q-T INTERVAL: 378 MS
EKG QRS DURATION: 94 MS
EKG QTC CALCULATION (BAZETT): 462 MS
EKG R AXIS: 18 DEGREES
EKG T AXIS: 34 DEGREES
EKG VENTRICULAR RATE: 90 BPM

## 2024-10-22 PROCEDURE — 93005 ELECTROCARDIOGRAM TRACING: CPT

## 2024-10-22 PROCEDURE — 93010 ELECTROCARDIOGRAM REPORT: CPT | Performed by: INTERNAL MEDICINE

## 2024-10-22 PROCEDURE — 4500000002 HC ER NO CHARGE

## 2024-10-22 ASSESSMENT — LIFESTYLE VARIABLES
HOW MANY STANDARD DRINKS CONTAINING ALCOHOL DO YOU HAVE ON A TYPICAL DAY: 7 TO 9
HOW OFTEN DO YOU HAVE A DRINK CONTAINING ALCOHOL: 4 OR MORE TIMES A WEEK

## 2024-10-22 NOTE — ED TRIAGE NOTES
Patient presents with complaints of epigastric pain that radiates to his RUQ x 2. Patient also reports low back pain, denies any injury. Patient reports he has a history of pancreatitis.

## 2024-11-20 ENCOUNTER — APPOINTMENT (OUTPATIENT)
Dept: PRIMARY CARE | Facility: CLINIC | Age: 44
End: 2024-11-20

## 2024-12-24 PROBLEM — G72.0 STATIN MYOPATHY: Status: ACTIVE | Noted: 2024-12-24

## 2024-12-24 PROBLEM — T46.6X5A STATIN MYOPATHY: Status: ACTIVE | Noted: 2024-12-24

## 2025-01-14 DIAGNOSIS — M10.9 GOUT, UNSPECIFIED CAUSE, UNSPECIFIED CHRONICITY, UNSPECIFIED SITE: ICD-10-CM

## 2025-01-14 RX ORDER — ALLOPURINOL 100 MG/1
100 TABLET ORAL 2 TIMES DAILY
Qty: 30 TABLET | Refills: 0 | Status: SHIPPED | OUTPATIENT
Start: 2025-01-14

## 2025-01-14 NOTE — TELEPHONE ENCOUNTER
Recent Visits  Date Type Provider Dept   04/16/24 Office Visit Yevgeniy Olmedo, DO [facility]   01/16/24 Office Visit Yevgeniy Olmedo, DO [facility]   Showing recent visits within past 365 days and meeting all other requirements  Future Appointments  Date Type Provider Dept   01/16/25 Appointment Yevgeniy Olmedo,  [facility]   Showing future appointments within next 90 days and meeting all other requirements     Last VV 5/16/24

## 2025-01-16 ENCOUNTER — OFFICE VISIT (OUTPATIENT)
Dept: PRIMARY CARE | Facility: CLINIC | Age: 45
End: 2025-01-16
Payer: COMMERCIAL

## 2025-01-16 VITALS
RESPIRATION RATE: 16 BRPM | HEIGHT: 74 IN | BODY MASS INDEX: 32.73 KG/M2 | OXYGEN SATURATION: 97 % | HEART RATE: 83 BPM | DIASTOLIC BLOOD PRESSURE: 88 MMHG | TEMPERATURE: 98.5 F | SYSTOLIC BLOOD PRESSURE: 130 MMHG | WEIGHT: 255 LBS

## 2025-01-16 DIAGNOSIS — M10.9 GOUT, UNSPECIFIED CAUSE, UNSPECIFIED CHRONICITY, UNSPECIFIED SITE: ICD-10-CM

## 2025-01-16 DIAGNOSIS — R53.83 OTHER FATIGUE: ICD-10-CM

## 2025-01-16 DIAGNOSIS — E66.09 EXOGENOUS OBESITY: ICD-10-CM

## 2025-01-16 DIAGNOSIS — E78.5 DM TYPE 2 WITH DIABETIC DYSLIPIDEMIA (MULTI): Primary | ICD-10-CM

## 2025-01-16 DIAGNOSIS — E11.69 DM TYPE 2 WITH DIABETIC DYSLIPIDEMIA (MULTI): Primary | ICD-10-CM

## 2025-01-16 DIAGNOSIS — I10 PRIMARY HYPERTENSION: ICD-10-CM

## 2025-01-16 DIAGNOSIS — E78.2 MIXED HYPERLIPIDEMIA: ICD-10-CM

## 2025-01-16 LAB
POC FINGERSTICK BLOOD GLUCOSE: 221 MG/DL (ref 70–100)
POC HEMOGLOBIN A1C: 8.3 % (ref 4.2–6.5)

## 2025-01-16 PROCEDURE — 99213 OFFICE O/P EST LOW 20 MIN: CPT | Performed by: FAMILY MEDICINE

## 2025-01-16 PROCEDURE — 82962 GLUCOSE BLOOD TEST: CPT | Performed by: FAMILY MEDICINE

## 2025-01-16 PROCEDURE — 83036 HEMOGLOBIN GLYCOSYLATED A1C: CPT | Performed by: FAMILY MEDICINE

## 2025-01-16 RX ORDER — TIRZEPATIDE 2.5 MG/.5ML
2.5 INJECTION, SOLUTION SUBCUTANEOUS WEEKLY
Qty: 0.5 ML | Refills: 0 | COMMUNITY
Start: 2025-01-16

## 2025-01-16 RX ORDER — ATORVASTATIN CALCIUM 40 MG/1
40 TABLET, FILM COATED ORAL DAILY
Qty: 30 TABLET | Refills: 5 | Status: SHIPPED | OUTPATIENT
Start: 2025-01-16 | End: 2025-07-15

## 2025-01-16 RX ORDER — COLCHICINE 0.6 MG/1
0.6 TABLET ORAL DAILY
Qty: 30 TABLET | Refills: 5 | Status: SHIPPED | OUTPATIENT
Start: 2025-01-16

## 2025-01-16 RX ORDER — BLOOD-GLUCOSE SENSOR
EACH MISCELLANEOUS
Qty: 6 EACH | Refills: 1 | Status: SHIPPED | OUTPATIENT
Start: 2025-01-16

## 2025-01-16 RX ORDER — LISINOPRIL AND HYDROCHLOROTHIAZIDE 12.5; 2 MG/1; MG/1
1 TABLET ORAL DAILY
Qty: 30 TABLET | Refills: 5 | Status: SHIPPED | OUTPATIENT
Start: 2025-01-16

## 2025-01-16 RX ORDER — FENOFIBRATE 145 MG/1
145 TABLET, FILM COATED ORAL DAILY
Qty: 30 TABLET | Refills: 5 | Status: SHIPPED | OUTPATIENT
Start: 2025-01-16

## 2025-01-16 ASSESSMENT — ENCOUNTER SYMPTOMS
SLEEP DISTURBANCE: 0
TROUBLE SWALLOWING: 0
ABDOMINAL DISTENTION: 0
FATIGUE: 0
CONSTITUTIONAL NEGATIVE: 1
HEMATURIA: 0
DYSPHORIC MOOD: 0
FLANK PAIN: 0
SINUS PRESSURE: 0
AGITATION: 0
ACTIVITY CHANGE: 0
POLYDIPSIA: 0
BLOOD IN STOOL: 0
DECREASED CONCENTRATION: 0
SPEECH DIFFICULTY: 0
HEADACHES: 0
RHINORRHEA: 0
SORE THROAT: 0
RECTAL PAIN: 0
NECK STIFFNESS: 0
EYE PAIN: 0
PHOTOPHOBIA: 0
DEPRESSION: 0
CONSTIPATION: 0
SINUS PAIN: 0
SEIZURES: 0
POLYPHAGIA: 0
OCCASIONAL FEELINGS OF UNSTEADINESS: 0
ABDOMINAL PAIN: 0
LOSS OF SENSATION IN FEET: 0
STRIDOR: 0
ARTHRALGIAS: 0
COLOR CHANGE: 0
DIARRHEA: 0
DIZZINESS: 0
SHORTNESS OF BREATH: 0
FEVER: 0
NERVOUS/ANXIOUS: 0
ADENOPATHY: 0
DYSURIA: 0
APPETITE CHANGE: 0
CHEST TIGHTNESS: 0
PALPITATIONS: 0
MYALGIAS: 0
COUGH: 0
CONFUSION: 0

## 2025-01-16 ASSESSMENT — PATIENT HEALTH QUESTIONNAIRE - PHQ9
1. LITTLE INTEREST OR PLEASURE IN DOING THINGS: NOT AT ALL
2. FEELING DOWN, DEPRESSED OR HOPELESS: NOT AT ALL
SUM OF ALL RESPONSES TO PHQ9 QUESTIONS 1 AND 2: 0

## 2025-01-16 NOTE — PROGRESS NOTES
Subjective   Patient ID: Jacky Richardson is a 44 y.o. male who presents for Med Refill.    HPI   Patient is at the office today to get medications refill.    Patient reports he is trying to quit smoking habits and also reports that he has not use any alcohol  in 2 weeks.    Glucose levels reported at 250  in the mornings and during the day around 150. Patient reports he monitors glucose at least 3x/day. Patient states  he is compliant with medications prescribed to manage elevated glucose levels.    Samples of Ozempic requested today.    Testosterone medication needs to be discussed today.  Review of Systems   Constitutional: Negative.  Negative for activity change, appetite change, fatigue and fever.   HENT:  Negative for congestion, dental problem, ear discharge, ear pain, mouth sores, rhinorrhea, sinus pressure, sinus pain, sore throat, tinnitus and trouble swallowing.    Eyes:  Negative for photophobia, pain and visual disturbance.   Respiratory:  Negative for cough, chest tightness, shortness of breath and stridor.    Cardiovascular:  Negative for chest pain and palpitations.   Gastrointestinal:  Negative for abdominal distention, abdominal pain, blood in stool, constipation, diarrhea and rectal pain.   Endocrine: Negative for cold intolerance, heat intolerance, polydipsia, polyphagia and polyuria.   Genitourinary:  Negative for dysuria, flank pain, hematuria and urgency.   Musculoskeletal:  Negative for arthralgias, gait problem, myalgias and neck stiffness.   Skin:  Negative for color change and rash.   Allergic/Immunologic: Negative for environmental allergies and food allergies.   Neurological:  Negative for dizziness, seizures, syncope, speech difficulty and headaches.   Hematological:  Negative for adenopathy.   Psychiatric/Behavioral:  Negative for agitation, confusion, decreased concentration, dysphoric mood and sleep disturbance. The patient is not nervous/anxious.        Objective   /88 (BP  "Location: Right arm, Patient Position: Sitting, BP Cuff Size: Large adult)   Pulse 83   Temp 36.9 °C (98.5 °F) (Temporal)   Resp 16   Ht 1.88 m (6' 2\")   Wt 116 kg (255 lb)   SpO2 97%   BMI 32.74 kg/m²     Physical Exam  Vitals reviewed.   Constitutional:       General: He is not in acute distress.     Appearance: He is obese. He is not ill-appearing or diaphoretic.   HENT:      Head: Normocephalic.      Right Ear: Tympanic membrane and external ear normal.      Left Ear: Tympanic membrane and external ear normal.      Nose: Nose normal. No congestion.      Mouth/Throat:      Pharynx: No posterior oropharyngeal erythema.   Eyes:      General:         Right eye: No discharge.         Left eye: No discharge.      Extraocular Movements: Extraocular movements intact.      Conjunctiva/sclera: Conjunctivae normal.      Pupils: Pupils are equal, round, and reactive to light.   Cardiovascular:      Rate and Rhythm: Normal rate and regular rhythm.      Pulses: Normal pulses.      Heart sounds: Normal heart sounds. No murmur heard.  Pulmonary:      Effort: Pulmonary effort is normal. No respiratory distress.      Breath sounds: Normal breath sounds. No wheezing or rales.   Chest:      Chest wall: No tenderness.   Abdominal:      General: Bowel sounds are normal. There is distension.      Palpations: There is no mass.      Tenderness: There is no abdominal tenderness. There is no guarding.   Musculoskeletal:         General: No tenderness. Normal range of motion.      Cervical back: Normal range of motion and neck supple. No tenderness.      Right lower leg: No edema.      Left lower leg: No edema.   Skin:     General: Skin is dry.      Coloration: Skin is not jaundiced.      Findings: No bruising, erythema or rash.   Neurological:      General: No focal deficit present.      Mental Status: He is alert and oriented to person, place, and time. Mental status is at baseline.      Cranial Nerves: No cranial nerve deficit. "      Sensory: No sensory deficit.      Coordination: Coordination normal.      Gait: Gait normal.   Psychiatric:         Mood and Affect: Mood normal.         Thought Content: Thought content normal.         Judgment: Judgment normal.         Assessment/Plan   Problem List Items Addressed This Visit             ICD-10-CM    Mixed hyperlipidemia E78.2    Relevant Medications    atorvastatin (Lipitor) 40 mg tablet    fenofibrate (Tricor) 145 mg tablet    Other Relevant Orders    CBC and Auto Differential    Comprehensive Metabolic Panel    Lipid Panel    DM type 2 with diabetic dyslipidemia (Multi) - Primary E11.69, E78.5    Relevant Medications    FreeStyle Aliza 3 Sensor device    tirzepatide (Mounjaro) 2.5 mg/0.5 mL pen injector    Other Relevant Orders    POCT glycosylated hemoglobin (Hb A1C) manually resulted (Completed)    POCT fingerstick glucose manually resulted (Completed)    CBC and Auto Differential    Comprehensive Metabolic Panel    Lipid Panel    Gout M10.9    Relevant Medications    colchicine 0.6 mg tablet     Other Visit Diagnoses         Codes    Primary hypertension     I10    Relevant Medications    lisinopriL-hydrochlorothiazide 20-12.5 mg tablet    Other Relevant Orders    CBC and Auto Differential    Comprehensive Metabolic Panel    Lipid Panel    Other fatigue     R53.83    Relevant Orders    Thyroid Stimulating Hormone    Exogenous obesity     E66.09    BMI 32.0-32.9,adult     Z68.32             Scribe Attestation  By signing my name below, I, KRISTY Prabhakar , Scribe   attest that this documentation has been prepared under the direction and in the presence of Yevgeniy Olmedo DO.     All medical record entries made by the Scribe were at my direction and personally dictated by me. I have reviewed the chart and agree that the record accurately reflects my personal performance of the history, physical exam, discussion and plan.

## 2025-01-16 NOTE — PATIENT INSTRUCTIONS
Follow up in 3 months    Continue current medications and therapy for chronic medical conditions.    Patient was advised importance of proper diet/nutrition in addition adequate hydration. Patient was encouraged moderate exercise program to include 30 minutes daily for 5 days of the week or 150 minutes weekly. Patient will follow-up with us as scheduled.    I personally reviewed the OAR[facility] report for this patient. I have considered the risks of abuse, dependence, addiction, and diversion.    UDS/CSA:    In office Accu-Chek and hemoglobin A1c    Start ejss 3    Advised eye exam    Patient mitts he is given up alcohol and is working on smoking cessation.    Review lab results from March 2024    Obtain lab to include fasting lipid profile, CMP, PSA and CBC    Start Mounjaro 2.5 mg weekly

## 2025-02-06 DIAGNOSIS — E11.69 DM TYPE 2 WITH DIABETIC DYSLIPIDEMIA (MULTI): ICD-10-CM

## 2025-02-06 DIAGNOSIS — E78.5 DM TYPE 2 WITH DIABETIC DYSLIPIDEMIA (MULTI): ICD-10-CM

## 2025-02-17 DIAGNOSIS — M10.9 GOUT, UNSPECIFIED CAUSE, UNSPECIFIED CHRONICITY, UNSPECIFIED SITE: ICD-10-CM

## 2025-02-17 RX ORDER — ALLOPURINOL 100 MG/1
100 TABLET ORAL 2 TIMES DAILY
Qty: 30 TABLET | Refills: 2 | Status: SHIPPED | OUTPATIENT
Start: 2025-02-17

## 2025-02-17 NOTE — TELEPHONE ENCOUNTER
Recent Visits  Date Type Provider Dept   01/16/25 Office Visit Yevgenyi Olmedo, DO [facility]   Showing recent visits within past 180 days and meeting all other requirements  Future Appointments  Date Type Provider Dept   04/21/25 Appointment Yevgeniy Olmedo DO [facility]   Showing future appointments within next 90 days and meeting all other requirements

## 2025-04-03 DIAGNOSIS — E11.69 DM TYPE 2 WITH DIABETIC DYSLIPIDEMIA (MULTI): ICD-10-CM

## 2025-04-03 DIAGNOSIS — E78.5 DM TYPE 2 WITH DIABETIC DYSLIPIDEMIA (MULTI): ICD-10-CM

## 2025-04-16 ENCOUNTER — APPOINTMENT (OUTPATIENT)
Dept: PRIMARY CARE | Facility: CLINIC | Age: 45
End: 2025-04-16
Payer: COMMERCIAL

## 2025-04-21 ENCOUNTER — APPOINTMENT (OUTPATIENT)
Dept: PRIMARY CARE | Facility: CLINIC | Age: 45
End: 2025-04-21
Payer: COMMERCIAL

## 2025-05-20 ENCOUNTER — APPOINTMENT (OUTPATIENT)
Dept: PRIMARY CARE | Facility: CLINIC | Age: 45
End: 2025-05-20
Payer: COMMERCIAL

## 2025-05-25 DIAGNOSIS — M10.9 GOUT, UNSPECIFIED CAUSE, UNSPECIFIED CHRONICITY, UNSPECIFIED SITE: ICD-10-CM

## 2025-05-27 RX ORDER — ALLOPURINOL 100 MG/1
100 TABLET ORAL 2 TIMES DAILY
Qty: 30 TABLET | Refills: 2 | Status: SHIPPED | OUTPATIENT
Start: 2025-05-27